# Patient Record
Sex: MALE | Race: WHITE | NOT HISPANIC OR LATINO
[De-identification: names, ages, dates, MRNs, and addresses within clinical notes are randomized per-mention and may not be internally consistent; named-entity substitution may affect disease eponyms.]

---

## 2022-12-29 PROBLEM — Z00.00 ENCOUNTER FOR PREVENTIVE HEALTH EXAMINATION: Status: ACTIVE | Noted: 2022-12-29

## 2023-03-01 ENCOUNTER — APPOINTMENT (OUTPATIENT)
Dept: NEPHROLOGY | Facility: CLINIC | Age: 73
End: 2023-03-01
Payer: MEDICARE

## 2023-03-01 ENCOUNTER — NON-APPOINTMENT (OUTPATIENT)
Age: 73
End: 2023-03-01

## 2023-03-01 VITALS
SYSTOLIC BLOOD PRESSURE: 120 MMHG | WEIGHT: 201 LBS | HEART RATE: 65 BPM | BODY MASS INDEX: 28.77 KG/M2 | TEMPERATURE: 98.3 F | HEIGHT: 70 IN | RESPIRATION RATE: 16 BRPM | OXYGEN SATURATION: 98 % | DIASTOLIC BLOOD PRESSURE: 84 MMHG

## 2023-03-01 PROCEDURE — 99407 BEHAV CHNG SMOKING > 10 MIN: CPT

## 2023-03-01 PROCEDURE — 99205 OFFICE O/P NEW HI 60 MIN: CPT | Mod: 25

## 2023-03-01 NOTE — HISTORY OF PRESENT ILLNESS
[FreeTextEntry1] : 72 yr old male, current smoker, hx DMII on lantus, PVD requiring three surgeries with stent placement (aorta and b/l fem artery stents) sent to office from cardiologist (Kimo Solano) for elevated creatinine. \par \par Current: pt/seen examined; feels well; here with daughter today; \par has detailed record of previous labwork, SCr ranging from 1.7-2.3 over last 6mos; \par SCr (12/2021-2/2023);1.76 ->1.51->2.11->2.33->2.06->2.74->2.30

## 2023-03-01 NOTE — ASSESSMENT
[FreeTextEntry1] : 1. CKD IV\par 2. DMII\par 3. HTN\par 4. PVD w/ stenting\par 5. Smoker\par \par \par Kidney US/Duplex; \par UA w/ micro; urine protein/creatinine ratio; \par Pt had labwork completed 2/8/2023;\par Continue with metropolol Succ 50mg daily; \par Pt down to 2 cigarettes weekly; did smoke 1 ppd for 50 years\par discussed smoking cessation > 7 min\par \par RTO 1month \par \par

## 2023-03-01 NOTE — PHYSICAL EXAM
[General Appearance - Alert] : alert [General Appearance - In No Acute Distress] : in no acute distress [Sclera] : the sclera and conjunctiva were normal [Outer Ear] : the ears and nose were normal in appearance [Neck Appearance] : the appearance of the neck was normal [] : no respiratory distress [Apical Impulse] : the apical impulse was normal [Heart Sounds] : normal S1 and S2 [Edema] : there was no peripheral edema [Bowel Sounds] : normal bowel sounds [No CVA Tenderness] : no ~M costovertebral angle tenderness [No Spinal Tenderness] : no spinal tenderness [Abnormal Walk] : normal gait [Cranial Nerves] : cranial nerves 2-12 were intact [Oriented To Time, Place, And Person] : oriented to person, place, and time

## 2023-03-02 ENCOUNTER — APPOINTMENT (OUTPATIENT)
Dept: ULTRASOUND IMAGING | Facility: CLINIC | Age: 73
End: 2023-03-02
Payer: MEDICARE

## 2023-03-02 ENCOUNTER — RESULT REVIEW (OUTPATIENT)
Age: 73
End: 2023-03-02

## 2023-03-02 ENCOUNTER — OUTPATIENT (OUTPATIENT)
Dept: OUTPATIENT SERVICES | Facility: HOSPITAL | Age: 73
LOS: 1 days | End: 2023-03-02
Payer: MEDICARE

## 2023-03-02 DIAGNOSIS — N18.4 CHRONIC KIDNEY DISEASE, STAGE 4 (SEVERE): ICD-10-CM

## 2023-03-02 PROCEDURE — 93975 VASCULAR STUDY: CPT | Mod: 26

## 2023-03-02 PROCEDURE — 93975 VASCULAR STUDY: CPT

## 2023-03-06 ENCOUNTER — TRANSCRIPTION ENCOUNTER (OUTPATIENT)
Age: 73
End: 2023-03-06

## 2023-03-06 LAB
APPEARANCE: CLEAR
BACTERIA: NEGATIVE
BILIRUBIN URINE: NEGATIVE
BLOOD URINE: NEGATIVE
COLOR: NORMAL
CREAT SPEC-SCNC: 74 MG/DL
CREAT/PROT UR: 0.5 RATIO
GLUCOSE QUALITATIVE U: ABNORMAL
HYALINE CASTS: 1 /LPF
KETONES URINE: NEGATIVE
LEUKOCYTE ESTERASE URINE: NEGATIVE
MICROSCOPIC-UA: NORMAL
NITRITE URINE: NEGATIVE
PH URINE: 6
PROT UR-MCNC: 39 MG/DL
PROTEIN URINE: ABNORMAL
RED BLOOD CELLS URINE: 0 /HPF
SPECIFIC GRAVITY URINE: 1.01
SQUAMOUS EPITHELIAL CELLS: 1 /HPF
UROBILINOGEN URINE: NORMAL
WHITE BLOOD CELLS URINE: 1 /HPF

## 2023-04-13 ENCOUNTER — APPOINTMENT (OUTPATIENT)
Dept: NEPHROLOGY | Facility: CLINIC | Age: 73
End: 2023-04-13
Payer: MEDICARE

## 2023-04-13 VITALS
TEMPERATURE: 98.4 F | OXYGEN SATURATION: 95 % | DIASTOLIC BLOOD PRESSURE: 76 MMHG | RESPIRATION RATE: 16 BRPM | SYSTOLIC BLOOD PRESSURE: 136 MMHG | HEART RATE: 75 BPM

## 2023-04-13 PROCEDURE — 99215 OFFICE O/P EST HI 40 MIN: CPT | Mod: 25

## 2023-04-13 PROCEDURE — 99407 BEHAV CHNG SMOKING > 10 MIN: CPT

## 2023-04-13 NOTE — HISTORY OF PRESENT ILLNESS
[FreeTextEntry1] : 72 yr old male, current smoker, hx DMII on lantus, PVD requiring three surgeries with stent placement (aorta and b/l fem artery stents) sent to office from cardiologist (Kimo Solano) for elevated creatinine. \par \par Current: pt/seen examined; feels well; here with daughter today; \par has detailed record of previous labwork, SCr ranging from 1.7-2.3 over last 6mos; \par SCr (12/2021-2/2023);1.76 ->1.51->2.11->2.33->2.06->2.74->2.30\par \par Current: Pt seen/examined; no complaints; feeling well;

## 2023-04-13 NOTE — ASSESSMENT
[FreeTextEntry1] : 1. CKD IV\par 2. DMII\par 3. HTN\par 4. PVD w/ stenting\par 5. Smoker\par \par \par Kidney US/Duplex; reviewed normal\par 0.5gm proteinuria\par Continue with metropolol Succ 50mg daily; \par Pt down to 2 cigarettes weekly; did smoke 1 ppd for 50 years\par discussed smoking cessation > 7 min\par \par RTO 3month \par \par

## 2023-08-31 ENCOUNTER — APPOINTMENT (OUTPATIENT)
Dept: NEPHROLOGY | Facility: CLINIC | Age: 73
End: 2023-08-31

## 2023-10-05 ENCOUNTER — APPOINTMENT (OUTPATIENT)
Dept: PULMONOLOGY | Facility: CLINIC | Age: 73
End: 2023-10-05
Payer: MEDICARE

## 2023-10-05 VITALS — HEIGHT: 69.5 IN | BODY MASS INDEX: 27.95 KG/M2

## 2023-10-05 VITALS
WEIGHT: 192 LBS | SYSTOLIC BLOOD PRESSURE: 130 MMHG | HEART RATE: 73 BPM | RESPIRATION RATE: 16 BRPM | BODY MASS INDEX: 27.55 KG/M2 | DIASTOLIC BLOOD PRESSURE: 72 MMHG | OXYGEN SATURATION: 98 %

## 2023-10-05 DIAGNOSIS — I25.10 ATHEROSCLEROTIC HEART DISEASE OF NATIVE CORONARY ARTERY W/OUT ANGINA PECTORIS: ICD-10-CM

## 2023-10-05 DIAGNOSIS — F17.200 NICOTINE DEPENDENCE, UNSPECIFIED, UNCOMPLICATED: ICD-10-CM

## 2023-10-05 DIAGNOSIS — E11.37X3 TYPE 2 DIABETES MELLITUS WITH DIABETIC MACULAR EDEMA, RESOLVED FOLLOWING TREATMENT, BILATERAL: ICD-10-CM

## 2023-10-05 DIAGNOSIS — I77.82 ANTINEUTROPHILIC CYTOPLASMIC ANTIBODY [ANCA] VASCULITIS: ICD-10-CM

## 2023-10-05 DIAGNOSIS — Z78.9 OTHER SPECIFIED HEALTH STATUS: ICD-10-CM

## 2023-10-05 DIAGNOSIS — I73.9 PERIPHERAL VASCULAR DISEASE, UNSPECIFIED: ICD-10-CM

## 2023-10-05 DIAGNOSIS — R05.3 CHRONIC COUGH: ICD-10-CM

## 2023-10-05 DIAGNOSIS — K22.70 BARRETT'S ESOPHAGUS W/OUT DYSPLASIA: ICD-10-CM

## 2023-10-05 DIAGNOSIS — Z82.0 FAMILY HISTORY OF EPILEPSY AND OTHER DISEASES OF THE NERVOUS SYSTEM: ICD-10-CM

## 2023-10-05 DIAGNOSIS — N18.4 CHRONIC KIDNEY DISEASE, STAGE 4 (SEVERE): ICD-10-CM

## 2023-10-05 DIAGNOSIS — Z86.39 PERSONAL HISTORY OF OTHER ENDOCRINE, NUTRITIONAL AND METABOLIC DISEASE: ICD-10-CM

## 2023-10-05 DIAGNOSIS — Z79.4 TYPE 2 DIABETES MELLITUS WITH DIABETIC MACULAR EDEMA, RESOLVED FOLLOWING TREATMENT, BILATERAL: ICD-10-CM

## 2023-10-05 DIAGNOSIS — K22.2 ESOPHAGEAL OBSTRUCTION: ICD-10-CM

## 2023-10-05 DIAGNOSIS — Z86.16 PERSONAL HISTORY OF COVID-19: ICD-10-CM

## 2023-10-05 DIAGNOSIS — Z86.79 PERSONAL HISTORY OF OTHER DISEASES OF THE CIRCULATORY SYSTEM: ICD-10-CM

## 2023-10-05 DIAGNOSIS — K21.9 GASTRO-ESOPHAGEAL REFLUX DISEASE W/OUT ESOPHAGITIS: ICD-10-CM

## 2023-10-05 PROCEDURE — 99406 BEHAV CHNG SMOKING 3-10 MIN: CPT

## 2023-10-05 PROCEDURE — 99205 OFFICE O/P NEW HI 60 MIN: CPT | Mod: 25

## 2023-10-05 RX ORDER — ASPIRIN 81 MG
81 TABLET, DELAYED RELEASE (ENTERIC COATED) ORAL
Refills: 0 | Status: ACTIVE | COMMUNITY

## 2023-10-05 RX ORDER — METOPROLOL TARTRATE 50 MG/1
50 TABLET, FILM COATED ORAL
Refills: 0 | Status: ACTIVE | COMMUNITY

## 2023-10-05 RX ORDER — OMEPRAZOLE MAGNESIUM 40 MG/1
40 CAPSULE, DELAYED RELEASE ORAL
Refills: 0 | Status: ACTIVE | COMMUNITY

## 2023-10-05 RX ORDER — ROSUVASTATIN CALCIUM 10 MG/1
10 TABLET, FILM COATED ORAL
Refills: 0 | Status: ACTIVE | COMMUNITY

## 2023-10-09 ENCOUNTER — APPOINTMENT (OUTPATIENT)
Dept: THORACIC SURGERY | Facility: CLINIC | Age: 73
End: 2023-10-09
Payer: MEDICARE

## 2023-10-09 VITALS
BODY MASS INDEX: 27.92 KG/M2 | OXYGEN SATURATION: 98 % | SYSTOLIC BLOOD PRESSURE: 113 MMHG | DIASTOLIC BLOOD PRESSURE: 62 MMHG | RESPIRATION RATE: 16 BRPM | HEART RATE: 67 BPM | TEMPERATURE: 97.3 F | WEIGHT: 195 LBS | HEIGHT: 70 IN

## 2023-10-09 DIAGNOSIS — F17.200 NICOTINE DEPENDENCE, UNSPECIFIED, UNCOMPLICATED: ICD-10-CM

## 2023-10-09 PROCEDURE — 99204 OFFICE O/P NEW MOD 45 MIN: CPT

## 2023-10-09 RX ORDER — INSULIN GLARGINE 100 [IU]/ML
100 INJECTION, SOLUTION SUBCUTANEOUS AT BEDTIME
Refills: 0 | Status: ACTIVE | COMMUNITY

## 2023-10-12 ENCOUNTER — APPOINTMENT (OUTPATIENT)
Dept: PULMONOLOGY | Facility: CLINIC | Age: 73
End: 2023-10-12
Payer: MEDICARE

## 2023-10-12 VITALS — BODY MASS INDEX: 30.25 KG/M2 | HEIGHT: 67.5 IN | WEIGHT: 195 LBS

## 2023-10-12 DIAGNOSIS — R06.02 SHORTNESS OF BREATH: ICD-10-CM

## 2023-10-12 PROCEDURE — 85018 HEMOGLOBIN: CPT | Mod: QW

## 2023-10-12 PROCEDURE — 94729 DIFFUSING CAPACITY: CPT

## 2023-10-12 PROCEDURE — 94010 BREATHING CAPACITY TEST: CPT

## 2023-10-12 PROCEDURE — 94727 GAS DIL/WSHOT DETER LNG VOL: CPT

## 2023-10-26 ENCOUNTER — OUTPATIENT (OUTPATIENT)
Dept: OUTPATIENT SERVICES | Facility: HOSPITAL | Age: 73
LOS: 1 days | End: 2023-10-26
Payer: MEDICARE

## 2023-10-26 VITALS
WEIGHT: 198.42 LBS | HEIGHT: 70 IN | SYSTOLIC BLOOD PRESSURE: 110 MMHG | DIASTOLIC BLOOD PRESSURE: 60 MMHG | RESPIRATION RATE: 20 BRPM | OXYGEN SATURATION: 97 % | HEART RATE: 67 BPM | TEMPERATURE: 98 F

## 2023-10-26 DIAGNOSIS — R91.8 OTHER NONSPECIFIC ABNORMAL FINDING OF LUNG FIELD: ICD-10-CM

## 2023-10-26 DIAGNOSIS — Z95.5 PRESENCE OF CORONARY ANGIOPLASTY IMPLANT AND GRAFT: Chronic | ICD-10-CM

## 2023-10-26 DIAGNOSIS — G47.33 OBSTRUCTIVE SLEEP APNEA (ADULT) (PEDIATRIC): Chronic | ICD-10-CM

## 2023-10-26 DIAGNOSIS — Z01.818 ENCOUNTER FOR OTHER PREPROCEDURAL EXAMINATION: ICD-10-CM

## 2023-10-26 DIAGNOSIS — K22.2 ESOPHAGEAL OBSTRUCTION: Chronic | ICD-10-CM

## 2023-10-26 DIAGNOSIS — Z29.9 ENCOUNTER FOR PROPHYLACTIC MEASURES, UNSPECIFIED: ICD-10-CM

## 2023-10-26 DIAGNOSIS — I25.10 ATHEROSCLEROTIC HEART DISEASE OF NATIVE CORONARY ARTERY WITHOUT ANGINA PECTORIS: ICD-10-CM

## 2023-10-26 DIAGNOSIS — Z98.890 OTHER SPECIFIED POSTPROCEDURAL STATES: Chronic | ICD-10-CM

## 2023-10-26 DIAGNOSIS — R91.8 OTHER NONSPECIFIC ABNORMAL FINDING OF LUNG FIELD: Chronic | ICD-10-CM

## 2023-10-26 LAB
A1C WITH ESTIMATED AVERAGE GLUCOSE RESULT: 7.5 % — HIGH (ref 4–5.6)
A1C WITH ESTIMATED AVERAGE GLUCOSE RESULT: 7.5 % — HIGH (ref 4–5.6)
ALBUMIN SERPL ELPH-MCNC: 4.1 G/DL — SIGNIFICANT CHANGE UP (ref 3.3–5.2)
ALBUMIN SERPL ELPH-MCNC: 4.1 G/DL — SIGNIFICANT CHANGE UP (ref 3.3–5.2)
ALP SERPL-CCNC: 96 U/L — SIGNIFICANT CHANGE UP (ref 40–120)
ALP SERPL-CCNC: 96 U/L — SIGNIFICANT CHANGE UP (ref 40–120)
ALT FLD-CCNC: 13 U/L — SIGNIFICANT CHANGE UP
ALT FLD-CCNC: 13 U/L — SIGNIFICANT CHANGE UP
ANION GAP SERPL CALC-SCNC: 17 MMOL/L — SIGNIFICANT CHANGE UP (ref 5–17)
ANION GAP SERPL CALC-SCNC: 17 MMOL/L — SIGNIFICANT CHANGE UP (ref 5–17)
APTT BLD: 49.1 SEC — HIGH (ref 24.5–35.6)
APTT BLD: 49.1 SEC — HIGH (ref 24.5–35.6)
AST SERPL-CCNC: 13 U/L — SIGNIFICANT CHANGE UP
AST SERPL-CCNC: 13 U/L — SIGNIFICANT CHANGE UP
BASOPHILS # BLD AUTO: 0.14 K/UL — SIGNIFICANT CHANGE UP (ref 0–0.2)
BASOPHILS # BLD AUTO: 0.14 K/UL — SIGNIFICANT CHANGE UP (ref 0–0.2)
BASOPHILS NFR BLD AUTO: 2.2 % — HIGH (ref 0–2)
BASOPHILS NFR BLD AUTO: 2.2 % — HIGH (ref 0–2)
BILIRUB SERPL-MCNC: 0.3 MG/DL — LOW (ref 0.4–2)
BILIRUB SERPL-MCNC: 0.3 MG/DL — LOW (ref 0.4–2)
BUN SERPL-MCNC: 37 MG/DL — HIGH (ref 8–20)
BUN SERPL-MCNC: 37 MG/DL — HIGH (ref 8–20)
CALCIUM SERPL-MCNC: 8.9 MG/DL — SIGNIFICANT CHANGE UP (ref 8.4–10.5)
CALCIUM SERPL-MCNC: 8.9 MG/DL — SIGNIFICANT CHANGE UP (ref 8.4–10.5)
CHLORIDE SERPL-SCNC: 102 MMOL/L — SIGNIFICANT CHANGE UP (ref 96–108)
CHLORIDE SERPL-SCNC: 102 MMOL/L — SIGNIFICANT CHANGE UP (ref 96–108)
CO2 SERPL-SCNC: 21 MMOL/L — LOW (ref 22–29)
CO2 SERPL-SCNC: 21 MMOL/L — LOW (ref 22–29)
CREAT SERPL-MCNC: 2.86 MG/DL — HIGH (ref 0.5–1.3)
CREAT SERPL-MCNC: 2.86 MG/DL — HIGH (ref 0.5–1.3)
EGFR: 22 ML/MIN/1.73M2 — LOW
EGFR: 22 ML/MIN/1.73M2 — LOW
EOSINOPHIL # BLD AUTO: 2.33 K/UL — HIGH (ref 0–0.5)
EOSINOPHIL # BLD AUTO: 2.33 K/UL — HIGH (ref 0–0.5)
EOSINOPHIL NFR BLD AUTO: 36 % — HIGH (ref 0–6)
EOSINOPHIL NFR BLD AUTO: 36 % — HIGH (ref 0–6)
ESTIMATED AVERAGE GLUCOSE: 169 MG/DL — HIGH (ref 68–114)
ESTIMATED AVERAGE GLUCOSE: 169 MG/DL — HIGH (ref 68–114)
GLUCOSE SERPL-MCNC: 256 MG/DL — HIGH (ref 70–99)
GLUCOSE SERPL-MCNC: 256 MG/DL — HIGH (ref 70–99)
HCT VFR BLD CALC: 40.3 % — SIGNIFICANT CHANGE UP (ref 39–50)
HCT VFR BLD CALC: 40.3 % — SIGNIFICANT CHANGE UP (ref 39–50)
HGB BLD-MCNC: 13.1 G/DL — SIGNIFICANT CHANGE UP (ref 13–17)
HGB BLD-MCNC: 13.1 G/DL — SIGNIFICANT CHANGE UP (ref 13–17)
IMM GRANULOCYTES NFR BLD AUTO: 0.3 % — SIGNIFICANT CHANGE UP (ref 0–0.9)
IMM GRANULOCYTES NFR BLD AUTO: 0.3 % — SIGNIFICANT CHANGE UP (ref 0–0.9)
INR BLD: 0.94 RATIO — SIGNIFICANT CHANGE UP (ref 0.85–1.18)
INR BLD: 0.94 RATIO — SIGNIFICANT CHANGE UP (ref 0.85–1.18)
LYMPHOCYTES # BLD AUTO: 1.52 K/UL — SIGNIFICANT CHANGE UP (ref 1–3.3)
LYMPHOCYTES # BLD AUTO: 1.52 K/UL — SIGNIFICANT CHANGE UP (ref 1–3.3)
LYMPHOCYTES # BLD AUTO: 23.5 % — SIGNIFICANT CHANGE UP (ref 13–44)
LYMPHOCYTES # BLD AUTO: 23.5 % — SIGNIFICANT CHANGE UP (ref 13–44)
MCHC RBC-ENTMCNC: 30.6 PG — SIGNIFICANT CHANGE UP (ref 27–34)
MCHC RBC-ENTMCNC: 30.6 PG — SIGNIFICANT CHANGE UP (ref 27–34)
MCHC RBC-ENTMCNC: 32.5 GM/DL — SIGNIFICANT CHANGE UP (ref 32–36)
MCHC RBC-ENTMCNC: 32.5 GM/DL — SIGNIFICANT CHANGE UP (ref 32–36)
MCV RBC AUTO: 94.2 FL — SIGNIFICANT CHANGE UP (ref 80–100)
MCV RBC AUTO: 94.2 FL — SIGNIFICANT CHANGE UP (ref 80–100)
MONOCYTES # BLD AUTO: 0.75 K/UL — SIGNIFICANT CHANGE UP (ref 0–0.9)
MONOCYTES # BLD AUTO: 0.75 K/UL — SIGNIFICANT CHANGE UP (ref 0–0.9)
MONOCYTES NFR BLD AUTO: 11.6 % — SIGNIFICANT CHANGE UP (ref 2–14)
MONOCYTES NFR BLD AUTO: 11.6 % — SIGNIFICANT CHANGE UP (ref 2–14)
NEUTROPHILS # BLD AUTO: 1.71 K/UL — LOW (ref 1.8–7.4)
NEUTROPHILS # BLD AUTO: 1.71 K/UL — LOW (ref 1.8–7.4)
NEUTROPHILS NFR BLD AUTO: 26.4 % — LOW (ref 43–77)
NEUTROPHILS NFR BLD AUTO: 26.4 % — LOW (ref 43–77)
PLATELET # BLD AUTO: 223 K/UL — SIGNIFICANT CHANGE UP (ref 150–400)
PLATELET # BLD AUTO: 223 K/UL — SIGNIFICANT CHANGE UP (ref 150–400)
POTASSIUM SERPL-MCNC: 4.7 MMOL/L — SIGNIFICANT CHANGE UP (ref 3.5–5.3)
POTASSIUM SERPL-MCNC: 4.7 MMOL/L — SIGNIFICANT CHANGE UP (ref 3.5–5.3)
POTASSIUM SERPL-SCNC: 4.7 MMOL/L — SIGNIFICANT CHANGE UP (ref 3.5–5.3)
POTASSIUM SERPL-SCNC: 4.7 MMOL/L — SIGNIFICANT CHANGE UP (ref 3.5–5.3)
PROT SERPL-MCNC: 7.1 G/DL — SIGNIFICANT CHANGE UP (ref 6.6–8.7)
PROT SERPL-MCNC: 7.1 G/DL — SIGNIFICANT CHANGE UP (ref 6.6–8.7)
PROTHROM AB SERPL-ACNC: 10.4 SEC — SIGNIFICANT CHANGE UP (ref 9.5–13)
PROTHROM AB SERPL-ACNC: 10.4 SEC — SIGNIFICANT CHANGE UP (ref 9.5–13)
RBC # BLD: 4.28 M/UL — SIGNIFICANT CHANGE UP (ref 4.2–5.8)
RBC # BLD: 4.28 M/UL — SIGNIFICANT CHANGE UP (ref 4.2–5.8)
RBC # FLD: 14.5 % — SIGNIFICANT CHANGE UP (ref 10.3–14.5)
RBC # FLD: 14.5 % — SIGNIFICANT CHANGE UP (ref 10.3–14.5)
SODIUM SERPL-SCNC: 140 MMOL/L — SIGNIFICANT CHANGE UP (ref 135–145)
SODIUM SERPL-SCNC: 140 MMOL/L — SIGNIFICANT CHANGE UP (ref 135–145)
WBC # BLD: 6.47 K/UL — SIGNIFICANT CHANGE UP (ref 3.8–10.5)
WBC # BLD: 6.47 K/UL — SIGNIFICANT CHANGE UP (ref 3.8–10.5)
WBC # FLD AUTO: 6.47 K/UL — SIGNIFICANT CHANGE UP (ref 3.8–10.5)
WBC # FLD AUTO: 6.47 K/UL — SIGNIFICANT CHANGE UP (ref 3.8–10.5)

## 2023-10-26 PROCEDURE — 80053 COMPREHEN METABOLIC PANEL: CPT

## 2023-10-26 PROCEDURE — 85025 COMPLETE CBC W/AUTO DIFF WBC: CPT

## 2023-10-26 PROCEDURE — 85730 THROMBOPLASTIN TIME PARTIAL: CPT

## 2023-10-26 PROCEDURE — 93010 ELECTROCARDIOGRAM REPORT: CPT

## 2023-10-26 PROCEDURE — 85610 PROTHROMBIN TIME: CPT

## 2023-10-26 PROCEDURE — 83036 HEMOGLOBIN GLYCOSYLATED A1C: CPT

## 2023-10-26 PROCEDURE — G0463: CPT

## 2023-10-26 PROCEDURE — 93005 ELECTROCARDIOGRAM TRACING: CPT

## 2023-10-26 PROCEDURE — 36415 COLL VENOUS BLD VENIPUNCTURE: CPT

## 2023-10-26 RX ORDER — INSULIN GLARGINE 100 [IU]/ML
36 INJECTION, SOLUTION SUBCUTANEOUS
Refills: 0 | DISCHARGE

## 2023-10-26 RX ORDER — ASPIRIN/CALCIUM CARB/MAGNESIUM 324 MG
1 TABLET ORAL
Refills: 0 | DISCHARGE

## 2023-10-26 RX ORDER — ROSUVASTATIN CALCIUM 5 MG/1
1 TABLET ORAL
Refills: 0 | DISCHARGE

## 2023-10-26 RX ORDER — METOPROLOL TARTRATE 50 MG
1 TABLET ORAL
Refills: 0 | DISCHARGE

## 2023-10-26 RX ORDER — TICAGRELOR 90 MG/1
1 TABLET ORAL
Refills: 0 | DISCHARGE

## 2023-10-26 NOTE — H&P PST ADULT - NSICDXFAMILYHX_GEN_ALL_CORE_FT
FAMILY HISTORY:  Father  Still living? Unknown  FH: alcohol abuse, Age at diagnosis: Age Unknown    Mother  Still living? Unknown  FH: epilepsy, Age at diagnosis: Age Unknown

## 2023-10-26 NOTE — H&P PST ADULT - ASSESSMENT
Patient:   ANKUSH SAMUELS            MRN: [id number]            FIN: [id number]              Age:   49 years     Sex:  MALE     :  68   Associated Diagnoses:   None   Author:   CHE MEDRANO     Hospitalist Discharge Summary  Admission Date: _1/15/2020  Discharge Date: _2020  Discharge Disposition: _  HOME WITH HOME HEALTH  Discharge diagnoses: _  ACUTE RESPIRATORY FAILURE DUE TO ANGIOEDEMA  Hospital course: _    ACUTE RESPIRATORY FAILURE DUE TO ANGIOEDEMA  LIKELY FROM ACE INHIBITOR  intubated 1/15-  on h2ra, antihistamine, and steroids  ctm for improvement  hold arb/ace inhibitors indefinitely; unless absolutely needed  st/pt/ot to see  patient is well, cont on steroids at discharge  ACUTE ON CHRONIC ASTHMA  started on nebs and inhalers, cont steroids at home  LEUKOCYTOSIS  related to steroid administration  CHRONIC HTN HEART DISEASE WITH DIASTOLIC HF  CHRONIC JOSEPHINE  CHRONIC HLD  CHRONIC MORBID OBESITY  CHRONIC PSORIASIS  cont home meds      Labs between:  2020 10:47 to 2020 10:47  CBC:                 WBC  HgB  Hct  Plt  MCV  RDW   2020 (H) 18.3  (L) 11.5  (L) 35.5  346  83.9  (H) 18.8   DIFF:                 Seg  Neutroph//ABS  Lymph//ABS  Mono//ABS  EOS/ABS  2020 NOT APPLICABLE  93 // (H) 17.0  4 // (L) 0.7  2 // 0.4 0 // (L) 0.0  BMP:                 Na  Cl  BUN  Glu   2020 144  (H) 110  17  (H) 128                              K  CO2  Cr  Ca                              4.3  24  1.10  9.2          Primary Care Physician    Physician Name:  EVELIA CHEN  Specialty :  FAMILY PRACTICE  No Consulting Physicians.     Results Review   General results   Most recent results   Vitals between:   2020 10:49:18   TO   2020 10:49:18                   LAST RESULT MINIMUM MAXIMUM  Temperature 36.6 36.3 36.6  Heart Rate 50 41 72  Respiratory Rate 18 13 19  NISBP           122 106 136  NIDBP           73 61 96  NIMBP           86 75 106  SpO2                     95 93 99  GEN: NAD  CVS: NORMAL S1/S2, RRR  LUNGS: SCANT WHEEZES, NO RALES  ABD: BS PRESENT, NTND  EXT: NO EDEMA/CYANOSIS  SKIN: WARM AND DRY     Discharge Information     DISCHARGE MEDICATION LIST   Allergies: ACE Inhibitors (angiotensin converting enzyme inhibitors), PCN (penicillins), penicillin  MEDICATION  DOSE  ROUTE  FREQUENCY  SPECIAL INSTRUCTIONS   albuterol-ipratropium (albuterol-ipratropium inhalation 2.5-0.5 mg/3 mL solution (DuoNeb).)  3 mL  Nebulizer  Every 4 hours As Needed: shortness of breath    amLODIPine (Norvasc oral 5 mg tablet)  5 mg=1 tab  Oral  Daily  **Prescription electronically sent to Pharmacy: [facility] #[id number]  atorvastatin (atorvastatin oral 10 mg tablet)  10 mg=1 tab  Oral  Daily    carvedilol (carvedilol oral 3.125 mg tablet)  3.125 mg=1 tab  Oral  Twice daily    ferrous sulfate (ferrous sulfate 324 mg (65 mg elemental iron) oral delayed release tablet)  324 mg=1 tab  Oral  Daily    fluticasone-salmeterol (Advair Diskus 500 mcg-50 mcg inhalation powder)  1 puff  Inhaled  Twice daily    hydrALAZINE (hydrALAZINE oral 25 mg tablet (Apresoline))  25 mg=1 tab  Oral  Twice daily    predniSONE (predniSONE oral 20 mg tablet)  20 mg=1 tab  Oral  Twice daily, with morning and evening mealsFor 7 days  - with food or milk**Prescription electronically sent to Pharmacy: [facility] #[id number]  thiamine (thiamine (vitamin B1) oral 100 mg tablet)  100 mg=1 tab  Oral  Daily    torsemide (torsemide oral 10 mg tablet (Demadex))  10 mg=1 tab  Oral  Daily          Discharge Plan   Discharge Summary Plan   I spent 45 minutes on this patient's discharge chart.  DISCHARGE INSTRUCTIONS:  Patient to follow-up with PCP. Notified PCP.  Discussed with [patient], who verbalized understanding and compliance with follow-up instructions.   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 Patient reports his cough has been persistent over the past year, dry cough at first, but recently it has been getting worse. CXR showed an abnormality with right middle lobe nodule. Patient also reports dizziness at times. He denies fevers, chills, night sweats, chest pain, palpitations, syncope. Preop assessment prior to bronchoscopy w/Dr Ford scheduled for 11/1/2023    Patient was educated on preop preparation with written and verbal instructions. Pt was informed to obtain clearances >3 days before surgery. Pt will review medications with PCP. Pt was educated on NSAIDs, multivitamins and herbals that increase the risk of bleeding and need to be stopped 7 days before procedure. Pt verbalized understanding of the above.  72 yo M PMH of HTN, HLD, CAD w/stents x1 (on aspirin, Brilinta), PVD with stents placement (aorta and bilateral femoral artery stents), CKD Stage 4, IDDM2, HINA (does not use CPAP), current smoker (average of 1 ppd x 50+ years, now down to 2 cigarettes daily), ANCA associated vasculitis in  treated with steroids, Pinto's esophagus, GERD, Schatzki's ring, pulmonary nodules, presents with c/o dry cough and SOB with exertion. Patient reports his cough has been persistent over the past year and recently it has been getting worse. CXR showed an abnormality with right middle lobe nodule. Patient also reports dizziness at times. He denies fevers, chills, night sweats, chest pain, palpitations, syncope. Preop assessment prior to bronchoscopy w/Dr Ford scheduled for 2023    Patient was educated on preop preparation with written and verbal instructions. Pt was informed to obtain clearances >3 days before surgery. Pt will ask his cardiologist for instructions on Brilinta and aspirin. Pt was educated on NSAIDs, multivitamins and herbals that increase the risk of bleeding and need to be stopped 7 days before procedure. Pt verbalized understanding of the above.     OPIOID RISK TOOL    NAVA EACH BOX THAT APPLIES AND ADD TOTALS AT THE END    FAMILY HISTORY OF SUBSTANCE ABUSE                 FEMALE         MALE                                                Alcohol                             [  ]1 pt          [  ]3pts                                               Illegal Drugs                     [  ]2 pts        [  ]3pts                                               Rx Drugs                           [  ]4 pts        [  ]4 pts    PERSONAL HISTORY OF SUBSTANCE ABUSE                                                                                          Alcohol                             [  ]3 pts       [  ]3 pts                                               Illegal Drugs                     [  ]4 pts        [  ]4 pts                                               Rx Drugs                           [  ]5 pts        [  ]5 pts    AGE BETWEEN 16-45 YEARS                                      [  ]1 pt         [  ]1 pt    HISTORY OF PREADOLESCENT   SEXUAL ABUSE                                                             [  ]3 pts        [  ]0pts    PSYCHOLOGICAL DISEASE                     ADD, OCD, Bipolar, Schizophrenia        [  ]2 pts         [  ]2 pts                      Depression                                               [  ]1 pt           [  ]1 pt           SCORING TOTAL   (add numbers and type here)              ( 0 )                                     A score of 3 or lower indicated LOW risk for future opioid abuse  A score of 4 to 7 indicated moderate risk for future opioid abuse  A score of 8 or higher indicates a high risk for opioid abuse    CAPRINI VTE 2.0 SCORE [CLOT updated 2019]    AGE RELATED RISK FACTORS                                                       MOBILITY RELATED FACTORS  [ ] Age 41-60 years                                            (1 Point)                    [ ] Bed rest                                                        (1 Point)  [x ] Age: 61-74 years                                           (2 Points)                  [ ] Plaster cast                                                   (2 Points)  [ ] Age= 75 years                                              (3 Points)                    [ ] Bed bound for more than 72 hours                 (2 Points)    DISEASE RELATED RISK FACTORS                                               GENDER SPECIFIC FACTORS  [ ] Edema in the lower extremities                       (1 Point)              [ ] Pregnancy                                                     (1 Point)  [ ] Varicose veins                                               (1 Point)                     [ ] Post-partum < 6 weeks                                   (1 Point)             [ x] BMI > 25 Kg/m2                                            (1 Point)                     [ ] Hormonal therapy  or oral contraception          (1 Point)                 [ ] Sepsis (in the previous month)                        (1 Point)               [ ] History of pregnancy complications                 (1 point)  [ ] Pneumonia or serious lung disease                                               [ ] Unexplained or recurrent                     (1 Point)           (in the previous month)                               (1 Point)  [ ] Abnormal pulmonary function test                     (1 Point)                 SURGERY RELATED RISK FACTORS  [ ] Acute myocardial infarction                              (1 Point)               [ ]  Section                                             (1 Point)  [ ] Congestive heart failure (in the previous month)  (1 Point)      [ ] Minor surgery                                                  (1 Point)   [ ] Inflammatory bowel disease                             (1 Point)               [ ] Arthroscopic surgery                                        (2 Points)  [ ] Central venous access                                      (2 Points)                [x ] General surgery lasting more than 45 minutes (2 points)  [ ] Malignancy- Present or previous                   (2 Points)                [ ] Elective arthroplasty                                         (5 points)    [ ] Stroke (in the previous month)                          (5 Points)                                                                                                                                                           HEMATOLOGY RELATED FACTORS                                                 TRAUMA RELATED RISK FACTORS  [ ] Prior episodes of VTE                                     (3 Points)                [ ] Fracture of the hip, pelvis, or leg                       (5 Points)  [ ] Positive family history for VTE                         (3 Points)             [ ] Acute spinal cord injury (in the previous month)  (5 Points)  [ ] Prothrombin 99039 A                                     (3 Points)               [ ] Paralysis  (less than 1 month)                             (5 Points)  [ ] Factor V Leiden                                             (3 Points)                  [ ] Multiple Trauma within 1 month                        (5 Points)  [ ] Lupus anticoagulants                                     (3 Points)                                                           [ ] Anticardiolipin antibodies                               (3 Points)                                                       [ ] High homocysteine in the blood                      (3 Points)                                             [ ] Other congenital or acquired thrombophilia      (3 Points)                                                [ ] Heparin induced thrombocytopenia                  (3 Points)                                     Total Score [   5      ]

## 2023-10-26 NOTE — H&P PST ADULT - NSICDXPASTSURGICALHX_GEN_ALL_CORE_FT
PAST SURGICAL HISTORY:  History of cardiac cath      PAST SURGICAL HISTORY:  H/O colonoscopy     H/O heart artery stent     History of cardiac cath

## 2023-10-26 NOTE — H&P PST ADULT - PROBLEM SELECTOR PLAN 2
caprini score risk for dvt, SCD ordered, surgical team to assess for dvt prophylaxis caprini score 5, moderate risk for dvt, SCD ordered, surgical team to assess for dvt prophylaxis

## 2023-10-26 NOTE — H&P PST ADULT - HISTORY OF PRESENT ILLNESS
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 Patient reports his cough has been persistent over the past year, dry cough at first, but recently it has been getting worse. CXR showed an abnormality with right middle lobe nodule. Patient also reports dizziness at times. He denies fevers, chills, night sweats, chest pain, palpitations, syncope. Preop assessment prior to bronchoscopy w/Dr Ford scheduled for 11/1/2023    Results/Data:    Chest CT 9/30/2022 findings:   Multiple b/l nodules measuring up to 10 mm in size with peripheral GGO and coronary artery calcifications.     CXR from 9/15/2023: R midlung peripheral nodule and ? 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 Patient reports his cough has been persistent over the past year and recently it has been getting worse. CXR showed an abnormality with right middle lobe nodule. Patient also reports dizziness at times. He denies fevers, chills, night sweats, chest pain, palpitations, syncope. Preop assessment prior to bronchoscopy w/Dr Ford scheduled for 11/1/2023    Results/Data:    Chest CT 9/30/2022 findings:   Multiple b/l nodules measuring up to 10 mm in size with peripheral GGO and coronary artery calcifications.     CXR from 9/15/2023: R midlung peripheral nodule and ? RUL smaller nodule  Chest CT 9/18/2023: 18 mm RML nodule with areas of reticular changes and nodularity  PET CT 9/23/2023: uptake in RML nodule and R hilar lymph node suspicious for malignancy and regional mets

## 2023-10-26 NOTE — H&P PST ADULT - CARDIOVASCULAR
details… regular rate and rhythm/S1 S2 present/no gallops/no rub/no murmur/no JVD/no pedal edema/vascular

## 2023-10-26 NOTE — H&P PST ADULT - NSICDXPASTMEDICALHX_GEN_ALL_CORE_FT
PAST MEDICAL HISTORY:  CAD (coronary artery disease)     Other nonspecific abnormal finding of lung field      PAST MEDICAL HISTORY:  CAD (coronary artery disease)     Diabetic neuropathy     H/O vasculitis     HLD (hyperlipidemia)     Insulin dependent type 2 diabetes mellitus     HINA (obstructive sleep apnea)     Other nonspecific abnormal finding of lung field     Pulmonary nodules     PVD (peripheral vascular disease)     Schatzki's ring     Stage 4 chronic kidney disease

## 2023-10-26 NOTE — H&P PST ADULT - NSHP PST DIAGOTHER LIST_GEN_A_CORE
10/26/2023: abnormal labs reported to cardiothoracic NP/PA team and faxed to PCP Dr Rivas with request to address abnormal results, specifically Cr 2.86 and aPTT 49.1. Pat Murphy NP

## 2023-10-26 NOTE — H&P PST ADULT - PROBLEM SELECTOR PLAN 1
preop assessment, medical and cardiac clearance pending, flex bronch EBUS w/Dr Ford scheduled for 11/1/2023 preop assessment, medical and cardiac clearances pending, flex bronch EBUS w/Dr Ford scheduled for 11/1/2023

## 2023-11-01 ENCOUNTER — TRANSCRIPTION ENCOUNTER (OUTPATIENT)
Age: 73
End: 2023-11-01

## 2023-11-01 ENCOUNTER — OUTPATIENT (OUTPATIENT)
Dept: OUTPATIENT SERVICES | Facility: HOSPITAL | Age: 73
LOS: 1 days | End: 2023-11-01
Payer: MEDICARE

## 2023-11-01 ENCOUNTER — APPOINTMENT (OUTPATIENT)
Dept: THORACIC SURGERY | Facility: HOSPITAL | Age: 73
End: 2023-11-01

## 2023-11-01 ENCOUNTER — RESULT REVIEW (OUTPATIENT)
Age: 73
End: 2023-11-01

## 2023-11-01 DIAGNOSIS — Z95.5 PRESENCE OF CORONARY ANGIOPLASTY IMPLANT AND GRAFT: Chronic | ICD-10-CM

## 2023-11-01 DIAGNOSIS — Z98.890 OTHER SPECIFIED POSTPROCEDURAL STATES: Chronic | ICD-10-CM

## 2023-11-01 DIAGNOSIS — R91.8 OTHER NONSPECIFIC ABNORMAL FINDING OF LUNG FIELD: ICD-10-CM

## 2023-11-01 PROBLEM — Z86.79 PERSONAL HISTORY OF OTHER DISEASES OF THE CIRCULATORY SYSTEM: Chronic | Status: ACTIVE | Noted: 2023-10-26

## 2023-11-01 PROBLEM — N18.4 CHRONIC KIDNEY DISEASE, STAGE 4 (SEVERE): Chronic | Status: ACTIVE | Noted: 2023-10-26

## 2023-11-01 PROBLEM — E11.9 TYPE 2 DIABETES MELLITUS WITHOUT COMPLICATIONS: Chronic | Status: ACTIVE | Noted: 2023-10-26

## 2023-11-01 PROBLEM — E11.40 TYPE 2 DIABETES MELLITUS WITH DIABETIC NEUROPATHY, UNSPECIFIED: Chronic | Status: ACTIVE | Noted: 2023-10-26

## 2023-11-01 PROBLEM — K22.2 ESOPHAGEAL OBSTRUCTION: Chronic | Status: ACTIVE | Noted: 2023-10-26

## 2023-11-01 LAB
GLUCOSE BLDC GLUCOMTR-MCNC: 87 MG/DL — SIGNIFICANT CHANGE UP (ref 70–99)
GLUCOSE BLDC GLUCOMTR-MCNC: 87 MG/DL — SIGNIFICANT CHANGE UP (ref 70–99)

## 2023-11-01 PROCEDURE — 88360 TUMOR IMMUNOHISTOCHEM/MANUAL: CPT

## 2023-11-01 PROCEDURE — 71045 X-RAY EXAM CHEST 1 VIEW: CPT | Mod: 26

## 2023-11-01 PROCEDURE — 82962 GLUCOSE BLOOD TEST: CPT

## 2023-11-01 PROCEDURE — 88173 CYTOPATH EVAL FNA REPORT: CPT | Mod: 26

## 2023-11-01 PROCEDURE — 88341 IMHCHEM/IMCYTCHM EA ADD ANTB: CPT | Mod: 26

## 2023-11-01 PROCEDURE — 88172 CYTP DX EVAL FNA 1ST EA SITE: CPT

## 2023-11-01 PROCEDURE — 31653 BRONCH EBUS SAMPLNG 3/> NODE: CPT

## 2023-11-01 PROCEDURE — 88172 CYTP DX EVAL FNA 1ST EA SITE: CPT | Mod: 26

## 2023-11-01 PROCEDURE — 88341 IMHCHEM/IMCYTCHM EA ADD ANTB: CPT

## 2023-11-01 PROCEDURE — 88112 CYTOPATH CELL ENHANCE TECH: CPT

## 2023-11-01 PROCEDURE — 31624 DX BRONCHOSCOPE/LAVAGE: CPT

## 2023-11-01 PROCEDURE — 88305 TISSUE EXAM BY PATHOLOGIST: CPT

## 2023-11-01 PROCEDURE — 88112 CYTOPATH CELL ENHANCE TECH: CPT | Mod: 26,59

## 2023-11-01 PROCEDURE — C9399: CPT

## 2023-11-01 PROCEDURE — 88342 IMHCHEM/IMCYTCHM 1ST ANTB: CPT | Mod: 26

## 2023-11-01 PROCEDURE — 71045 X-RAY EXAM CHEST 1 VIEW: CPT

## 2023-11-01 PROCEDURE — 88305 TISSUE EXAM BY PATHOLOGIST: CPT | Mod: 26

## 2023-11-01 PROCEDURE — 88342 IMHCHEM/IMCYTCHM 1ST ANTB: CPT

## 2023-11-01 PROCEDURE — 88173 CYTOPATH EVAL FNA REPORT: CPT

## 2023-11-01 NOTE — BRIEF OPERATIVE NOTE - NSICDXBRIEFPROCEDURE_GEN_ALL_CORE_FT
PROCEDURES:  Bronchoscopy, with EBUS and bronchoalveolar lavage 01-Nov-2023 11:58:25 fine needle biopsy of lymph nodes Zita San

## 2023-11-10 PROBLEM — R93.89 ABNORMAL CHEST CT: Status: ACTIVE | Noted: 2023-10-05

## 2023-11-10 PROBLEM — R91.8 MULTIPLE LUNG NODULES ON CT: Status: ACTIVE | Noted: 2023-10-05

## 2023-11-13 ENCOUNTER — APPOINTMENT (OUTPATIENT)
Dept: THORACIC SURGERY | Facility: CLINIC | Age: 73
End: 2023-11-13

## 2023-11-13 DIAGNOSIS — R93.89 ABNORMAL FINDINGS ON DIAGNOSTIC IMAGING OF OTHER SPECIFIED BODY STRUCTURES: ICD-10-CM

## 2023-11-13 DIAGNOSIS — R91.8 OTHER NONSPECIFIC ABNORMAL FINDING OF LUNG FIELD: ICD-10-CM

## 2023-11-16 PROBLEM — I73.9 PERIPHERAL VASCULAR DISEASE, UNSPECIFIED: Chronic | Status: ACTIVE | Noted: 2023-10-26

## 2023-11-16 PROBLEM — G47.33 OBSTRUCTIVE SLEEP APNEA (ADULT) (PEDIATRIC): Chronic | Status: ACTIVE | Noted: 2023-10-26

## 2023-11-16 PROBLEM — R91.8 OTHER NONSPECIFIC ABNORMAL FINDING OF LUNG FIELD: Chronic | Status: ACTIVE | Noted: 2023-10-26

## 2023-11-16 PROBLEM — I25.10 ATHEROSCLEROTIC HEART DISEASE OF NATIVE CORONARY ARTERY WITHOUT ANGINA PECTORIS: Chronic | Status: ACTIVE | Noted: 2023-10-26

## 2023-11-16 PROBLEM — E78.5 HYPERLIPIDEMIA, UNSPECIFIED: Chronic | Status: ACTIVE | Noted: 2023-10-26

## 2023-11-20 ENCOUNTER — APPOINTMENT (OUTPATIENT)
Dept: THORACIC SURGERY | Facility: CLINIC | Age: 73
End: 2023-11-20
Payer: MEDICARE

## 2023-11-20 ENCOUNTER — APPOINTMENT (OUTPATIENT)
Dept: DERMATOLOGY | Facility: CLINIC | Age: 73
End: 2023-11-20
Payer: MEDICARE

## 2023-11-20 VITALS
RESPIRATION RATE: 16 BRPM | HEART RATE: 61 BPM | SYSTOLIC BLOOD PRESSURE: 131 MMHG | WEIGHT: 195 LBS | HEIGHT: 70 IN | DIASTOLIC BLOOD PRESSURE: 83 MMHG | BODY MASS INDEX: 27.92 KG/M2 | OXYGEN SATURATION: 97 % | TEMPERATURE: 97.6 F

## 2023-11-20 PROCEDURE — 99214 OFFICE O/P EST MOD 30 MIN: CPT

## 2023-11-20 PROCEDURE — 99204 OFFICE O/P NEW MOD 45 MIN: CPT

## 2023-11-20 RX ORDER — TICAGRELOR 90 MG/1
90 TABLET ORAL
Refills: 0 | Status: COMPLETED | COMMUNITY
End: 2023-11-20

## 2023-11-20 RX ORDER — SUCRALFATE 1 G/1
1 TABLET ORAL
Refills: 0 | Status: COMPLETED | COMMUNITY
End: 2023-11-20

## 2024-05-16 ENCOUNTER — APPOINTMENT (OUTPATIENT)
Dept: DERMATOLOGY | Facility: CLINIC | Age: 74
End: 2024-05-16
Payer: MEDICARE

## 2024-05-16 PROCEDURE — 99213 OFFICE O/P EST LOW 20 MIN: CPT

## 2024-07-03 ENCOUNTER — APPOINTMENT (OUTPATIENT)
Dept: NEPHROLOGY | Facility: CLINIC | Age: 74
End: 2024-07-03
Payer: MEDICARE

## 2024-07-03 VITALS
OXYGEN SATURATION: 97 % | HEART RATE: 90 BPM | DIASTOLIC BLOOD PRESSURE: 58 MMHG | TEMPERATURE: 95 F | BODY MASS INDEX: 26.05 KG/M2 | SYSTOLIC BLOOD PRESSURE: 116 MMHG | HEIGHT: 70 IN | WEIGHT: 182 LBS

## 2024-07-03 DIAGNOSIS — I73.9 PERIPHERAL VASCULAR DISEASE, UNSPECIFIED: ICD-10-CM

## 2024-07-03 DIAGNOSIS — Z87.891 PERSONAL HISTORY OF NICOTINE DEPENDENCE: ICD-10-CM

## 2024-07-03 DIAGNOSIS — Z79.4 TYPE 2 DIABETES MELLITUS WITH DIABETIC MACULAR EDEMA, RESOLVED FOLLOWING TREATMENT, BILATERAL: ICD-10-CM

## 2024-07-03 DIAGNOSIS — E11.37X3 TYPE 2 DIABETES MELLITUS WITH DIABETIC MACULAR EDEMA, RESOLVED FOLLOWING TREATMENT, BILATERAL: ICD-10-CM

## 2024-07-03 DIAGNOSIS — F17.200 NICOTINE DEPENDENCE, UNSPECIFIED, UNCOMPLICATED: ICD-10-CM

## 2024-07-03 DIAGNOSIS — N18.4 CHRONIC KIDNEY DISEASE, STAGE 4 (SEVERE): ICD-10-CM

## 2024-07-03 PROCEDURE — 99214 OFFICE O/P EST MOD 30 MIN: CPT

## 2024-08-27 ENCOUNTER — APPOINTMENT (OUTPATIENT)
Dept: DERMATOLOGY | Facility: CLINIC | Age: 74
End: 2024-08-27
Payer: MEDICARE

## 2024-08-27 PROCEDURE — 11104 PUNCH BX SKIN SINGLE LESION: CPT

## 2024-08-27 PROCEDURE — 99213 OFFICE O/P EST LOW 20 MIN: CPT | Mod: 25

## 2024-09-10 ENCOUNTER — APPOINTMENT (OUTPATIENT)
Dept: DERMATOLOGY | Facility: CLINIC | Age: 74
End: 2024-09-10

## 2024-11-13 ENCOUNTER — APPOINTMENT (OUTPATIENT)
Dept: NEPHROLOGY | Facility: CLINIC | Age: 74
End: 2024-11-13

## 2024-12-10 ENCOUNTER — INPATIENT (INPATIENT)
Facility: HOSPITAL | Age: 74
LOS: 7 days | Discharge: HOME | DRG: 195 | End: 2024-12-18
Attending: STUDENT IN AN ORGANIZED HEALTH CARE EDUCATION/TRAINING PROGRAM | Admitting: HOSPITALIST
Payer: MEDICARE

## 2024-12-10 VITALS
TEMPERATURE: 98 F | DIASTOLIC BLOOD PRESSURE: 46 MMHG | OXYGEN SATURATION: 93 % | SYSTOLIC BLOOD PRESSURE: 79 MMHG | RESPIRATION RATE: 24 BRPM | HEART RATE: 132 BPM

## 2024-12-10 DIAGNOSIS — J12.9 VIRAL PNEUMONIA, UNSPECIFIED: ICD-10-CM

## 2024-12-10 DIAGNOSIS — Z98.890 OTHER SPECIFIED POSTPROCEDURAL STATES: Chronic | ICD-10-CM

## 2024-12-10 DIAGNOSIS — Z95.5 PRESENCE OF CORONARY ANGIOPLASTY IMPLANT AND GRAFT: Chronic | ICD-10-CM

## 2024-12-10 LAB
ALBUMIN SERPL ELPH-MCNC: 3.6 G/DL — SIGNIFICANT CHANGE UP (ref 3.3–5.2)
ALP SERPL-CCNC: 90 U/L — SIGNIFICANT CHANGE UP (ref 40–120)
ALT FLD-CCNC: 16 U/L — SIGNIFICANT CHANGE UP
ANION GAP SERPL CALC-SCNC: 21 MMOL/L — HIGH (ref 5–17)
ANISOCYTOSIS BLD QL: SLIGHT — SIGNIFICANT CHANGE UP
APTT BLD: 31.2 SEC — SIGNIFICANT CHANGE UP (ref 24.5–35.6)
AST SERPL-CCNC: 18 U/L — SIGNIFICANT CHANGE UP
BASOPHILS # BLD AUTO: 0.1 K/UL — SIGNIFICANT CHANGE UP (ref 0–0.2)
BASOPHILS NFR BLD AUTO: 0.9 % — SIGNIFICANT CHANGE UP (ref 0–2)
BILIRUB SERPL-MCNC: 0.3 MG/DL — LOW (ref 0.4–2)
BUN SERPL-MCNC: 35.8 MG/DL — HIGH (ref 8–20)
CALCIUM SERPL-MCNC: 9.1 MG/DL — SIGNIFICANT CHANGE UP (ref 8.4–10.5)
CHLORIDE SERPL-SCNC: 100 MMOL/L — SIGNIFICANT CHANGE UP (ref 96–108)
CO2 SERPL-SCNC: 16 MMOL/L — LOW (ref 22–29)
CREAT SERPL-MCNC: 3.44 MG/DL — HIGH (ref 0.5–1.3)
EGFR: 18 ML/MIN/1.73M2 — LOW
EOSINOPHIL # BLD AUTO: 1.2 K/UL — HIGH (ref 0–0.5)
EOSINOPHIL NFR BLD AUTO: 10.7 % — HIGH (ref 0–6)
FLUAV AG NPH QL: SIGNIFICANT CHANGE UP
FLUBV AG NPH QL: SIGNIFICANT CHANGE UP
GIANT PLATELETS BLD QL SMEAR: PRESENT — SIGNIFICANT CHANGE UP
GLUCOSE BLDC GLUCOMTR-MCNC: 333 MG/DL — HIGH (ref 70–99)
GLUCOSE SERPL-MCNC: 178 MG/DL — HIGH (ref 70–99)
HCT VFR BLD CALC: 30.6 % — LOW (ref 39–50)
HGB BLD-MCNC: 9.5 G/DL — LOW (ref 13–17)
HYPOCHROMIA BLD QL: SLIGHT — SIGNIFICANT CHANGE UP
INR BLD: 1.13 RATIO — SIGNIFICANT CHANGE UP (ref 0.85–1.16)
LYMPHOCYTES # BLD AUTO: 1.2 K/UL — SIGNIFICANT CHANGE UP (ref 1–3.3)
LYMPHOCYTES # BLD AUTO: 10.7 % — LOW (ref 13–44)
MANUAL SMEAR VERIFICATION: SIGNIFICANT CHANGE UP
MCHC RBC-ENTMCNC: 29.3 PG — SIGNIFICANT CHANGE UP (ref 27–34)
MCHC RBC-ENTMCNC: 31 G/DL — LOW (ref 32–36)
MCV RBC AUTO: 94.4 FL — SIGNIFICANT CHANGE UP (ref 80–100)
METAMYELOCYTES # FLD: 1.8 % — HIGH (ref 0–0)
MICROCYTES BLD QL: SLIGHT — SIGNIFICANT CHANGE UP
MONOCYTES # BLD AUTO: 1 K/UL — HIGH (ref 0–0.9)
MONOCYTES NFR BLD AUTO: 8.9 % — SIGNIFICANT CHANGE UP (ref 2–14)
MYELOCYTES NFR BLD: 1.8 % — HIGH (ref 0–0)
NEUTROPHILS # BLD AUTO: 7 K/UL — SIGNIFICANT CHANGE UP (ref 1.8–7.4)
NEUTROPHILS NFR BLD AUTO: 61.6 % — SIGNIFICANT CHANGE UP (ref 43–77)
NEUTS BAND # BLD: 0.9 % — SIGNIFICANT CHANGE UP (ref 0–8)
OVALOCYTES BLD QL SMEAR: SLIGHT — SIGNIFICANT CHANGE UP
PLAT MORPH BLD: NORMAL — SIGNIFICANT CHANGE UP
PLATELET # BLD AUTO: 322 K/UL — SIGNIFICANT CHANGE UP (ref 150–400)
POIKILOCYTOSIS BLD QL AUTO: SLIGHT — SIGNIFICANT CHANGE UP
POLYCHROMASIA BLD QL SMEAR: SLIGHT — SIGNIFICANT CHANGE UP
POTASSIUM SERPL-MCNC: 4.8 MMOL/L — SIGNIFICANT CHANGE UP (ref 3.5–5.3)
POTASSIUM SERPL-SCNC: 4.8 MMOL/L — SIGNIFICANT CHANGE UP (ref 3.5–5.3)
PROMYELOCYTES # FLD: 0.9 % — HIGH (ref 0–0)
PROT SERPL-MCNC: 7.1 G/DL — SIGNIFICANT CHANGE UP (ref 6.6–8.7)
PROTHROM AB SERPL-ACNC: 12.8 SEC — SIGNIFICANT CHANGE UP (ref 9.9–13.4)
RBC # BLD: 3.24 M/UL — LOW (ref 4.2–5.8)
RBC # FLD: 16.2 % — HIGH (ref 10.3–14.5)
RBC BLD AUTO: ABNORMAL
RSV RNA NPH QL NAA+NON-PROBE: SIGNIFICANT CHANGE UP
SARS-COV-2 RNA SPEC QL NAA+PROBE: DETECTED
SODIUM SERPL-SCNC: 137 MMOL/L — SIGNIFICANT CHANGE UP (ref 135–145)
TROPONIN T, HIGH SENSITIVITY RESULT: 35 NG/L — SIGNIFICANT CHANGE UP (ref 0–51)
TROPONIN T, HIGH SENSITIVITY RESULT: 41 NG/L — SIGNIFICANT CHANGE UP (ref 0–51)
VARIANT LYMPHS # BLD: 1.8 % — SIGNIFICANT CHANGE UP (ref 0–6)
WBC # BLD: 11.2 K/UL — HIGH (ref 3.8–10.5)
WBC # FLD AUTO: 11.2 K/UL — HIGH (ref 3.8–10.5)

## 2024-12-10 PROCEDURE — 70450 CT HEAD/BRAIN W/O DYE: CPT | Mod: 26,MC

## 2024-12-10 PROCEDURE — 71046 X-RAY EXAM CHEST 2 VIEWS: CPT | Mod: 26

## 2024-12-10 PROCEDURE — 99222 1ST HOSP IP/OBS MODERATE 55: CPT

## 2024-12-10 PROCEDURE — 99223 1ST HOSP IP/OBS HIGH 75: CPT

## 2024-12-10 PROCEDURE — 99285 EMERGENCY DEPT VISIT HI MDM: CPT | Mod: GC

## 2024-12-10 RX ORDER — 0.9 % SODIUM CHLORIDE 0.9 %
1000 INTRAVENOUS SOLUTION INTRAVENOUS
Refills: 0 | Status: DISCONTINUED | OUTPATIENT
Start: 2024-12-10 | End: 2024-12-11

## 2024-12-10 RX ORDER — INSULIN GLARGINE 100 [IU]/ML
31 INJECTION, SOLUTION SUBCUTANEOUS AT BEDTIME
Refills: 0 | Status: DISCONTINUED | OUTPATIENT
Start: 2024-12-11 | End: 2024-12-11

## 2024-12-10 RX ORDER — INSULIN GLARGINE 100 [IU]/ML
31 INJECTION, SOLUTION SUBCUTANEOUS ONCE
Refills: 0 | Status: COMPLETED | OUTPATIENT
Start: 2024-12-10 | End: 2024-12-10

## 2024-12-10 RX ORDER — 0.9 % SODIUM CHLORIDE 0.9 %
2400 INTRAVENOUS SOLUTION INTRAVENOUS ONCE
Refills: 0 | Status: COMPLETED | OUTPATIENT
Start: 2024-12-10 | End: 2024-12-10

## 2024-12-10 RX ORDER — PIPERACILLIN SODIUM AND TAZOBACTAM SODIUM 4; .5 G/20ML; G/20ML
3.38 INJECTION, POWDER, LYOPHILIZED, FOR SOLUTION INTRAVENOUS ONCE
Refills: 0 | Status: COMPLETED | OUTPATIENT
Start: 2024-12-10 | End: 2024-12-10

## 2024-12-10 RX ORDER — BENZONATATE 100 MG/1
100 CAPSULE ORAL ONCE
Refills: 0 | Status: COMPLETED | OUTPATIENT
Start: 2024-12-10 | End: 2024-12-10

## 2024-12-10 RX ORDER — ACETAMINOPHEN 500MG 500 MG/1
1000 TABLET, COATED ORAL ONCE
Refills: 0 | Status: COMPLETED | OUTPATIENT
Start: 2024-12-10 | End: 2024-12-10

## 2024-12-10 RX ORDER — GLUCAGON INJECTION, SOLUTION 0.5 MG/.1ML
1 INJECTION, SOLUTION SUBCUTANEOUS ONCE
Refills: 0 | Status: DISCONTINUED | OUTPATIENT
Start: 2024-12-10 | End: 2024-12-11

## 2024-12-10 RX ORDER — METHYLPREDNISOLONE SOD SUCC 125 MG
125 VIAL (EA) INJECTION ONCE
Refills: 0 | Status: COMPLETED | OUTPATIENT
Start: 2024-12-10 | End: 2024-12-10

## 2024-12-10 RX ADMIN — BENZONATATE 100 MILLIGRAM(S): 100 CAPSULE ORAL at 14:38

## 2024-12-10 RX ADMIN — INSULIN GLARGINE 31 UNIT(S): 100 INJECTION, SOLUTION SUBCUTANEOUS at 23:54

## 2024-12-10 RX ADMIN — Medication 2400 MILLILITER(S): at 14:00

## 2024-12-10 RX ADMIN — ACETAMINOPHEN 500MG 1000 MILLIGRAM(S): 500 TABLET, COATED ORAL at 16:29

## 2024-12-10 RX ADMIN — ACETAMINOPHEN 500MG 400 MILLIGRAM(S): 500 TABLET, COATED ORAL at 14:37

## 2024-12-10 RX ADMIN — PIPERACILLIN SODIUM AND TAZOBACTAM SODIUM 200 GRAM(S): 4; .5 INJECTION, POWDER, LYOPHILIZED, FOR SOLUTION INTRAVENOUS at 14:38

## 2024-12-10 RX ADMIN — Medication 125 MILLIGRAM(S): at 14:37

## 2024-12-10 RX ADMIN — PIPERACILLIN SODIUM AND TAZOBACTAM SODIUM 3.38 GRAM(S): 4; .5 INJECTION, POWDER, LYOPHILIZED, FOR SOLUTION INTRAVENOUS at 16:29

## 2024-12-10 RX ADMIN — Medication 2400 MILLILITER(S): at 12:53

## 2024-12-10 NOTE — ED PROVIDER NOTE - CARE PLAN
1 Principal Discharge DX:	Viral pneumonia  Secondary Diagnosis:	2019 novel coronavirus disease (COVID-19)

## 2024-12-10 NOTE — ED PROVIDER NOTE - PROGRESS NOTE DETAILS
Eloise: Labs show leukocytosis 11, lactate 5.2. Trop 41, rpt 2 hours. Ordered ofirmev for body aches and cough meds. Eloise: CXR shows rt sided infiltrate at base. Dose zosyn ordered. Eloise: Labs show leukocytosis 11, lactate 5.2. Trop 41, rpt 2 hours. Ordered ofirmev for body aches and cough meds. Troponin likely from demand ischemia. Eloise: rpt lactate 1.8. Rpt trop 35. Spoke to cardio, likely demand ischemia. Tim: Case discussed with hospitalist and will admit to telemetry

## 2024-12-10 NOTE — ED ADULT NURSE NOTE - OBJECTIVE STATEMENT
c/o chest pain. Pt stated he was at his PCP for a follow up appointment after being diagnosed with PNA. Pt suddenly developed 10/10 chest pain. Pt received 2 sprays of nitro by EMS PTA. Pt denies any CP, dizziness, N/V/D, CP, palpitations, fevers, chills at this time. Pt AOx4, speaking coherently, respirations even and unlabored on RA, skin warm and dry, ST on CM. c/o chest pain. Pt stated he was at his PCP for a follow up appointment after being diagnosed with PNA. Pt suddenly developed 10/10 chest pain. Pt received 2 sprays of nitro by Dr. Evelyn PENA. Pt denies any CP, dizziness, N/V/D, CP, palpitations, fevers, chills at this time. Pt AOx4, speaking coherently, respirations even and unlabored on RA, skin warm and dry, ST on CM.

## 2024-12-10 NOTE — CONSULT NOTE ADULT - SUBJECTIVE AND OBJECTIVE BOX
CHIEF COMPLAINT:    HPI: 74 y.o. male with hx of coronary artery disease s/p drug-eluting stent therapy of the proximal LAD in November 2022 , carotid artery disease (80% left carotid stenosis), peripheral arterial disease right SFA stent therapy at that time and then subsequently underwent left SFA stent therapy in 2023, hypertension, hyperlipidemia and lung cancer treated with chemotherapy and radiation and apparently has no evidence of disease at this time. He presented to [facility] in February 2024 with a left occipital stroke, confirmed by MRI. Patient comes to the ER complaining of chest pain. Patient was at Dr. Rivas PCP office for flu-like symptoms after recent dx of septic pneumonia 1 months ago & hospital stay at [place]. Patient was diagnosed with covid today at PCP. Patient had sudden, crushing, central nonradiating chest pain, PCP gave 2 sprays of nitro. Patient then had seizure-like acitivity per family, likely vasovagaled, BP was 70s/40s, hypoxic. Patient had no post-ictal state, patient is A&Ox3. Patient states no longer has chest pain. Patient c/o cough, SOB. Denies numbness/tingling, abdominal pain, N/V/D. NO recent falls or injury.    PAST MEDICAL & SURGICAL HISTORY:  Other nonspecific abnormal finding of lung field      CAD (coronary artery disease)      PVD (peripheral vascular disease)      H/O vasculitis      Schatzki's ring      Pulmonary nodules      HINA (obstructive sleep apnea)      Insulin dependent type 2 diabetes mellitus      HLD (hyperlipidemia)      Stage 4 chronic kidney disease      Diabetic neuropathy      History of cardiac cath      H/O heart artery stent      H/O colonoscopy          Allergies    sulfa topicals (Unknown)  Plavix (Stomach Upset)    Intolerances        MEDICATIONS  (STANDING):    MEDICATIONS  (PRN):      FAMILY HISTORY:  FH: epilepsy (Mother)    FH: alcohol abuse (Father)        ***No family history of premature coronary artery disease or sudden cardiac death    SOCIAL HISTORY:  Smoking-  Alcohol-  Illicit Drug use-    REVIEW OF SYSTEMS:  Constitutional: [ ] fever, [ ]weight loss,  [ ]fatigue  Eyes: [ ] visual changes  Respiratory: [ ]shortness of breath;  [ ] cough, [ ]wheezing, [ ]chills, [ ]hemoptysis  Cardiovascular: [ ] chest pain, [ ]palpitations, [ ]dizziness,  [ ]leg swelling [ ]syncope  Gastrointestinal: [ ] abdominal pain, [ ]nausea, [ ]vomiting,  [ ]diarrhea   Genitourinary: [ ] dysuria, [ ] hematuria  Neurologic: [ ] headaches [ ] tremors  [ ] weakness [ ] lightheadedness  Skin: [ ] itching, [ ]burning, [ ] rashes  Endocrine: [ ] heat or cold intolerance  Musculoskeletal: [ ] joint pain or swelling; [ ] muscle, back, or extremity pain  Psychiatric: [ ] depression, [ ]anxiety, [ ]mood swings, or [ ]difficulty sleeping  Hematologic: [ ] easy bruising, [ ] bleeding gums     [ x] All others negative	  [ ] Unable to obtain    Vital Signs Last 24 Hrs  T(C): 36.7 (10 Dec 2024 12:07), Max: 36.7 (10 Dec 2024 12:07)  T(F): 98 (10 Dec 2024 12:07), Max: 98 (10 Dec 2024 12:07)  HR: 100 (10 Dec 2024 14:49) (100 - 132)  BP: 123/61 (10 Dec 2024 14:49) (79/46 - 123/61)  BP(mean): 74 (10 Dec 2024 12:42) (74 - 74)  RR: 25 (10 Dec 2024 14:49) (24 - 26)  SpO2: 100% (10 Dec 2024 14:49) (93% - 100%)    Parameters below as of 10 Dec 2024 14:49  Patient On (Oxygen Delivery Method): nasal cannula  O2 Flow (L/min): 4    I&O's Summary      PHYSICAL EXAM:  General: No acute distress  HEENT: EOMI  Neck:  No JVD  Lungs: Clear to auscultation bilaterally; No rales or wheezing  Heart: Regular rate and rhythm; No murmurs, rubs, or gallops  Abdomen: soft, non tender, non distended   Extremities: warm, no edema   Nervous system:  Alert & Oriented X3  Psychiatric: Normal affect  Skin: No rashes or lesions    LABS:  12-10    137  |  100  |  35.8[H]  ----------------------------<  178[H]  4.8   |  16.0[L]  |  3.44[H]    Ca    9.1      10 Dec 2024 12:25    TPro  7.1  /  Alb  3.6  /  TBili  0.3[L]  /  DBili  x   /  AST  18  /  ALT  16  /  AlkPhos  90  12-10    Creatinine Trend: 3.44<--                        9.5    11.20 )-----------( 322      ( 10 Dec 2024 12:25 )             30.6     PT/INR - ( 10 Dec 2024 12:25 )   PT: 12.8 sec;   INR: 1.13 ratio         PTT - ( 10 Dec 2024 12:25 )  PTT:31.2 sec    Lipid Panel:   Cardiac Enzymes:           < from: Xray Chest 2 Views PA/Lat (12.10.24 @ 13:46) >  Right hilar infiltrate.    < from: 12 Lead ECG (12.10.24 @ 12:07) >  Sinus tachycardia  Nonspecific ST abnormality      TELEMETRY:    Echocardiography 4/4/24: Normal left ventricular size and function, mild left ventricular hypertrophy, ejection fraction 55-60%, diastolic dysfunction is noted. There is mild mitral regurgitation, mild aortic regurgitation, aortic root is dilated at 3.8 cm.     Carotid duplex scans 10/10/24: Patent left internal carotid stent, moderate (50% to 65%) right internal carotid artery narrowings.    STRESS TEST:    CATHETERIZATION: CHIEF COMPLAINT:    HPI: 74 y.o. male with hx of coronary artery disease s/p drug-eluting stent therapy of the proximal LAD in November 2022 , carotid artery disease (80% left carotid stenosis), peripheral arterial disease right SFA stent therapy at that time and then subsequently underwent left SFA stent therapy in 2023, hypertension, hyperlipidemia and lung cancer treated with chemotherapy and radiation and apparently has no evidence of disease at this time. He presented to [facility] in February 2024 with a left occipital stroke, confirmed by MRI. Patient comes to the ER complaining of chest pain. Patient was at Dr. Rivas PCP office for flu-like symptoms after recent dx of septic pneumonia 1 months ago & hospital stay at [place]. Patient was diagnosed with covid today at PCP. Patient had sudden, crushing, central nonradiating chest pain, PCP gave 2 sprays of nitro. Patient then had seizure-like acitivity per family, likely vasovagaled, BP was 70s/40s, hypoxic. Patient had no post-ictal state, patient is A&Ox3. Patient states no longer has chest pain. Patient c/o cough, SOB. Denies numbness/tingling, abdominal pain, N/V/D. NO recent falls or injury.    PAST MEDICAL & SURGICAL HISTORY:  Other nonspecific abnormal finding of lung field      CAD (coronary artery disease)      PVD (peripheral vascular disease)      H/O vasculitis      Schatzki's ring      Pulmonary nodules      HINA (obstructive sleep apnea)      Insulin dependent type 2 diabetes mellitus      HLD (hyperlipidemia)      Stage 4 chronic kidney disease      Diabetic neuropathy      History of cardiac cath      H/O heart artery stent      H/O colonoscopy          Allergies    sulfa topicals (Unknown)  Plavix (Stomach Upset)    Intolerances        MEDICATIONS  (STANDING):    MEDICATIONS  (PRN):      FAMILY HISTORY:  FH: epilepsy (Mother)    FH: alcohol abuse (Father)        ***No family history of premature coronary artery disease or sudden cardiac death    SOCIAL HISTORY:  Smoking-  Alcohol-  Illicit Drug use-    REVIEW OF SYSTEMS:  Constitutional: [ ] fever, [ ]weight loss,  [ ]fatigue  Eyes: [ ] visual changes  Respiratory: [ ]shortness of breath;  [ ] cough, [ ]wheezing, [ ]chills, [ ]hemoptysis  Cardiovascular: [ ] chest pain, [ ]palpitations, [ ]dizziness,  [ ]leg swelling [ ]syncope  Gastrointestinal: [ ] abdominal pain, [ ]nausea, [ ]vomiting,  [ ]diarrhea   Genitourinary: [ ] dysuria, [ ] hematuria  Neurologic: [ ] headaches [ ] tremors  [ ] weakness [ ] lightheadedness  Skin: [ ] itching, [ ]burning, [ ] rashes  Endocrine: [ ] heat or cold intolerance  Musculoskeletal: [ ] joint pain or swelling; [ ] muscle, back, or extremity pain  Psychiatric: [ ] depression, [ ]anxiety, [ ]mood swings, or [ ]difficulty sleeping  Hematologic: [ ] easy bruising, [ ] bleeding gums     [ x] All others negative	  [ ] Unable to obtain    Vital Signs Last 24 Hrs  T(C): 36.7 (10 Dec 2024 12:07), Max: 36.7 (10 Dec 2024 12:07)  T(F): 98 (10 Dec 2024 12:07), Max: 98 (10 Dec 2024 12:07)  HR: 100 (10 Dec 2024 14:49) (100 - 132)  BP: 123/61 (10 Dec 2024 14:49) (79/46 - 123/61)  BP(mean): 74 (10 Dec 2024 12:42) (74 - 74)  RR: 25 (10 Dec 2024 14:49) (24 - 26)  SpO2: 100% (10 Dec 2024 14:49) (93% - 100%)    Parameters below as of 10 Dec 2024 14:49  Patient On (Oxygen Delivery Method): nasal cannula  O2 Flow (L/min): 4    I&O's Summary      PHYSICAL EXAM:  General: No acute distress  HEENT: EOMI  Neck:  No JVD  Lungs: Clear to auscultation bilaterally; No rales or wheezing  Heart: Regular rate and rhythm; No murmurs, rubs, or gallops  Abdomen: soft, non tender, non distended   Extremities: warm, no edema   Nervous system:  Alert & Oriented X3  Psychiatric: Normal affect  Skin: No rashes or lesions    LABS:  12-10    137  |  100  |  35.8[H]  ----------------------------<  178[H]  4.8   |  16.0[L]  |  3.44[H]    Ca    9.1      10 Dec 2024 12:25    TPro  7.1  /  Alb  3.6  /  TBili  0.3[L]  /  DBili  x   /  AST  18  /  ALT  16  /  AlkPhos  90  12-10    Creatinine Trend: 3.44<--                        9.5    11.20 )-----------( 322      ( 10 Dec 2024 12:25 )             30.6     PT/INR - ( 10 Dec 2024 12:25 )   PT: 12.8 sec;   INR: 1.13 ratio         PTT - ( 10 Dec 2024 12:25 )  PTT:31.2 sec    Lipid Panel:   Cardiac Enzymes:           < from: Xray Chest 2 Views PA/Lat (12.10.24 @ 13:46) >  Right hilar infiltrate.    < from: 12 Lead ECG (12.10.24 @ 12:07) >  Sinus tachycardia  Nonspecific ST abnormality      TELEMETRY: SR    Echocardiography 4/4/24: Normal left ventricular size and function, mild left ventricular hypertrophy, ejection fraction 55-60%, diastolic dysfunction is noted. There is mild mitral regurgitation, mild aortic regurgitation, aortic root is dilated at 3.8 cm.     Carotid duplex scans 10/10/24: Patent left internal carotid stent, moderate (50% to 65%) right internal carotid artery narrowings.    STRESS TEST:    CATHETERIZATION:

## 2024-12-10 NOTE — ED ADULT TRIAGE NOTE - CHIEF COMPLAINT QUOTE
BIBEMS from [facility] with c/o 10/10 chest pain. was diagnosed with covid/pna. received 1 nitro at . hx 5 stents.

## 2024-12-10 NOTE — CONSULT NOTE ADULT - ASSESSMENT
74 y.o. male with hx of coronary artery disease s/p drug-eluting stent therapy of the proximal LAD in November 2022 , carotid artery disease (80% left carotid stenosis), peripheral arterial disease right SFA stent therapy at that time and then subsequently underwent left SFA stent therapy in 2023, hypertension, hyperlipidemia and lung cancer treated with chemotherapy and radiation and apparently has no evidence of disease at this time. He presented to [facility] in February 2024 with a left occipital stroke, confirmed by MRI. Patient comes to the ER complaining of chest pain. Patient was at Dr. Rivas PCP office for flu-like symptoms after recent dx of septic pneumonia 1 months ago & hospital stay at [place]. Patient was diagnosed with covid today at PCP. Patient had sudden, crushing, central nonradiating chest pain, PCP gave 2 sprays of nitro. Patient then had seizure-like acitivity per family, likely vasovagaled, BP was 70s/40s, hypoxic. Patient had no post-ictal state, patient is A&Ox3. Patient states no longer has chest pain. Patient c/o cough, SOB. Denies numbness/tingling, abdominal pain, N/V/D. NO recent falls or injury.   74 y.o. male with hx of coronary artery disease s/p drug-eluting stent therapy of the proximal LAD in November 2022 , carotid artery disease (80% left carotid stenosis), peripheral arterial disease right SFA stent therapy at that time and then subsequently underwent left SFA stent therapy in 2023, hypertension, hyperlipidemia and lung cancer treated with chemotherapy and radiation and apparently has no evidence of disease at this time. He presented to [facility] in February 2024 with a left occipital stroke, confirmed by MRI. Patient comes to the ER complaining of chest pain. Patient was at Dr. Rivas PCP office for flu-like symptoms after recent dx of septic pneumonia 1 months ago & hospital stay at [place]. Patient was diagnosed with covid today at PCP. Patient had sudden, crushing, central nonradiating chest pain, PCP gave 2 sprays of nitro. Patient then had seizure-like acitivity per family, likely vasovagaled, BP was 70s/40s, hypoxic. Patient had no post-ictal state, patient is A&Ox3. Patient states no longer has chest pain. Patient c/o cough, SOB. Denies numbness/tingling, abdominal pain, N/V/D. NO recent falls or injury.      Nonanginal chest pain   negative cardiac testing so far    Plan   obtain another troponin level   echocardiogram   telemetry while admitted  Rest as per ER/ Primary team

## 2024-12-10 NOTE — ED PROVIDER NOTE - CLINICAL SUMMARY MEDICAL DECISION MAKING FREE TEXT BOX
74y Male hx CAD s/p 5 stents, PAD, CKD, DM complaining of chest pain. Plan for labs, fluids, steroids, ct, cxr. 74y Male hx CAD s/p 5 stents, PAD, CKD, DM complaining of chest pain. Plan for labs, fluids, steroids, ct, cxr. Patient placed on 4L NC for hypoxia to low 90s on room air. Labs show leukocytosis 11, lactate 5.2. Trop 41, rpt 2 hours. Ordered ofirmev for body aches and cough meds. 74y Male hx CAD s/p 5 stents, PAD, CKD, DM complaining of chest pain. Plan for labs, fluids, steroids, ct, cxr. Patient placed on 4L NC for hypoxia to low 90s on room air. Labs show leukocytosis 11, lactate 5.2. Trop 41, rpt 2 hours, trop likely demand ischemia. Ordered ofirmev for body aches and cough meds. CXR shows rt sided infiltrate at base. Dose zosyn ordered. Rpt lactate ordered after fluids. Likely admit for viral pneumonia. 74y Male hx CAD s/p 5 stents, PAD, CKD, DM complaining of chest pain. Plan for labs, fluids, steroids, ct, cxr. Patient placed on 4L NC for hypoxia to low 90s on room air. Labs show leukocytosis 11, lactate 5.2. Trop 41, rpt 2 hours, trop likely demand ischemia. Ordered ofirmev for body aches and cough meds. CXR shows rt sided infiltrate at base. Dose zosyn ordered. Rpt lactate ordered after fluids. Likely admit for viral pneumonia. rpt lactate 1.8. Rpt trop 35. Spoke to cardio, likely demand ischemia.

## 2024-12-10 NOTE — ED PROVIDER NOTE - OBJECTIVE STATEMENT
74y Male hx CAD s/p 5 stents, PAD, CKD, DM complaining of chest pain. Patient was at Dr. Rivas PCP office for flu-like symptoms after recent dx of septic pneumonia 2 weeks prior & hospital stay at [place]. Patient was diagnosed with covid today at PCP. Patient had sudden, crushing, central nonradiating chest pain, PCP gave 2 sprays of nitro. Patient then had seizure-like acitivity per family, likely vasovagaled, BP was 70s/40s. Patient had no post-ictal state, patient is A&Ox3. Patient states no longer has chest pain. Patient c/o cough, SOB. Denies numbness/tingling, abdominal pain, N/V/D. 74y Male hx CAD s/p 5 stents, PAD, CKD, DM complaining of chest pain. Patient was at Dr. Rivas PCP office for flu-like symptoms after recent dx of septic pneumonia 1 months ago & hospital stay at [place]. Patient was diagnosed with covid today at PCP. Patient had sudden, crushing, central nonradiating chest pain, PCP gave 2 sprays of nitro. Patient then had seizure-like acitivity per family, likely vasovagaled, BP was 70s/40s. Patient had no post-ictal state, patient is A&Ox3. Patient states no longer has chest pain. Patient c/o cough, SOB. Denies numbness/tingling, abdominal pain, N/V/D. NO recent falls or injury. 74y Male hx CAD s/p 5 stents, PAD, CKD, DM complaining of chest pain. Patient was at Dr. Rivas PCP office for flu-like symptoms after recent dx of septic pneumonia 1 months ago & hospital stay at [place]. Patient was diagnosed with covid today at PCP. Patient had sudden, crushing, central nonradiating chest pain, PCP gave 2 sprays of nitro. Patient then had seizure-like acitivity per family, likely vasovagaled, BP was 70s/40s, hypoxic. Patient had no post-ictal state, patient is A&Ox3. Patient states no longer has chest pain. Patient c/o cough, SOB. Denies numbness/tingling, abdominal pain, N/V/D. NO recent falls or injury.

## 2024-12-10 NOTE — ED ADULT NURSE NOTE - NSFALLUNIVINTERV_ED_ALL_ED
Multilayer Compression Wrap   (Not Unna) Below the Knee    NAME:  Vita Coffey  YOB: 1932  MEDICAL RECORD NUMBER:  [id number]  DATE:  11/24/2023    Multilayer compression wrap: Removed old Multilayer wrap if indicated and wash leg with mild soap/water. Applied moisturizing agent to dry skin as needed. Applied primary and secondary dressing as ordered. Applied multilayered dressing below the knee to left lower leg. Instructed patient/caregiver not to remove dressing and to keep it clean and dry. Instructed patient/caregiver on complications to report to provider, such as pain, numbness in toes, heavy drainage, and slippage of dressing. Instructed patient on purpose of compression dressing and on activity and exercise recommendations.       Electronically signed by Tani Davison RN on 11/24/2023 at 11:10 AM Bed/Stretcher in lowest position, wheels locked, appropriate side rails in place/Call bell, personal items and telephone in reach/Instruct patient to call for assistance before getting out of bed/chair/stretcher/Non-slip footwear applied when patient is off stretcher/[place] to call system/Physically safe environment - no spills, clutter or unnecessary equipment/Purposeful proactive rounding/Room/bathroom lighting operational, light cord in reach

## 2024-12-10 NOTE — ED PEDIATRIC NURSE NOTE - OBJECTIVE STATEMENT
Pt presents to the ED with mom for difficulty breathing. Mom reports they went to PCP was given, prednisone and nebulizer, was better but began pulling again when they got home. Pt RR even and unlabored, subclavicular muscles pulling upon respiration. Pt UTD on vaccines. Mom reports he was sicklast week, got better for two days, then got worse again, cough since last week.

## 2024-12-10 NOTE — H&P ADULT - NSHPLABSRESULTS_GEN_ALL_CORE
Contacted pt to discuss department's initiative to protect all non-essential and high risk patients from COVID-19 exposure. Reviewed may continue with HEP as long as it is well tolerated/going well.   Explained that the clinic is cancelling visits for the n
12-11    136  |  103  |  42.3[H]  ----------------------------<  290[H]  5.2   |  17.0[L]  |  3.36[H]    Ca    8.6      11 Dec 2024 02:03    TPro  6.3[L]  /  Alb  3.2[L]  /  TBili  <0.2[L]  /  DBili  x   /  AST  18  /  ALT  18  /  AlkPhos  87  12-11                            8.8    8.22  )-----------( 257      ( 11 Dec 2024 02:03 )             28.1       CXR- R-hilar infiltrate     EKG- sinus tachycardia, , Q waves V1

## 2024-12-10 NOTE — ED PROVIDER NOTE - ATTENDING CONTRIBUTION TO CARE
74-year-old male presents to the ED via EMS from primary care physician's office for further evaluation of chest pain.   patient status post admission  [facility] last month for complicated pneumonia for  which she received Zyvox and was reported to be hypoxic requiring supplemental oxygen.  Patient states he was seen by Dr. Rivas last week and given antibiotics without improvement and then today was supposed to receive a "stronger antibiotic " when he complained of chest pain  was given 2 sprays of nitro and became hypotensive tachycardic.   on arrival to ED patient awake and alert appears uncomfortable tachypneic heart rate sinus 130s initially hypotensive BP 70/40's  which improved spontaneously after arrival patient states he was diagnosed with COVID today.    PERRL, mm moist, neck supple, heart tachycardic,  lungs coarse breath sounds bilaterally, abdomen soft no localized tenderness, extremities no cyanosis or edema, neuro no focal deficits.  Chest x-ray with right middle lobe infiltrate.  Will treat empirically for sepsis, however based on history of COVID patient most likely with viral pneumonia.  Patient follows with [facility]/Dr. Solano and will consult [facility]

## 2024-12-10 NOTE — H&P ADULT - HISTORY OF PRESENT ILLNESS
74yoM hx CAD s/p PCI (11/2022), PAD, CVA (2014), CKD4, lung cancer s/p chemotherapy and RT, admitted to [facility] in 11/2024 for sepsis and respiratory failure due to multifocal PNA, presenting with chest pain, cough and dyspnea.  Hx from chart review as pt declines interview.  Pt has had few days of cough and exertional dyspnea.  He developed intermittent chest pain over the past day prompting him to see his doctor.  He was given nitroglycerin in the office after episode of central, non-radiating chest pain, then had syncopal event with hypotension (BP reportedly 70/40).  After event, pt returned to baseline and was sent to the hospital.  On arrival to ED, pt hypotensive 79/46, tachycardic to the 130s and satting in 90s. BP improved after IVF resuscitation. COVID positive.  CXR notable for R-hilar infiltrate.

## 2024-12-10 NOTE — H&P ADULT - ASSESSMENT
74yoM hx CAD s/p PCI (11/2022), PAD, CVA (2014), CKD4, lung cancer s/p chemotherapy and RT, admitted to [facility] in 11/2024 for sepsis and respiratory failure due to multifocal PNA, presenting with chest pain, cough and dyspnea, on arrival to ED, hypotensive, tachycardic, tachypneic with mild leukocytosis, COVID+ and w/ CXR showing R-hilar infiltrate     Sepsis due to bacterial PNA and COVID  -Received zosyn by ED, will continue and add vancomycin given recent hospitalization   -Received solumedrol  by ED, will start dexamethasone given COVID  -Start remdesivir   -Elevated lactate 5.2 due to hypotension; normalized after IVF  -Obtain sputum culture, MRSA swab, urine Strep, Legionella  -Blood cx pending  -Placed on NC, wean as tolerated    Hypotension  -Multifactorial from nitroglycerin use as outpatient, hypovolemia from infection  -s/p 2.4L given by ED w/ improvement   -BP stable >4 hours after IVF, will do telemetry and VS q4hr  -Hold metoprolol for now, resume if BP remains stable    Atypical chest pain  -Suspect related to COVID, PNA  -Seen by cardiology, believes non-cardiac, but recommends TTE  -EKG w/out acute ischemic changes  -Troponin negative x 3  -Telemetry    CAMRON on CKD   -From hypovolemia from hypotension  -Cr improved slightly after IVF, unknown recent baseline, was 2.86 in 2023  -Obtain renal US and UA  -Resume NaHCO3    Anemia  -AOCD of kidney disease based on iron studies  -No reported bleeding  -Hgb in 8-9 range, was previously 13 in 2023    Hx DM with hyperglycemia  -Hyperglycemia >300, likely exacerbated by steroid use  -Resume lantus 31U w/ moderate ISS while on steroids    Hx CAD, PAD  -Resume ASA, statin   -B-blocker on hold due to hypotension on arrival    Hx lung cancer  -Details of current therapy unknown   -Outpatient oncology follow up    Medication management   -Med rec based on Med[facility] as pt doesn’t have list and only remembers lantus dose  -Call pharmacy in AM    Prophylactic measure  -Heparin 5000 units BID    Dispo: medically active pending IV abx, cx, symptomatic improvement.  Estimated LOS: possibly >3 days, pending PT eval.

## 2024-12-10 NOTE — H&P ADULT - NSHPPHYSICALEXAM_GEN_ALL_CORE
Vital Signs Last 24 Hrs  T(C): 36.7 (11 Dec 2024 05:10), Max: 36.8 (10 Dec 2024 16:26)  T(F): 98 (11 Dec 2024 05:10), Max: 98.3 (10 Dec 2024 16:26)  HR: 95 (11 Dec 2024 05:10) (80 - 132)  BP: 138/68 (11 Dec 2024 05:10) (79/46 - 138/68)  BP(mean): 74 (10 Dec 2024 12:42) (74 - 74)  RR: 19 (11 Dec 2024 05:10) (18 - 26)  SpO2: 98% (11 Dec 2024 05:10) (93% - 100%)    Parameters below as of 11 Dec 2024 05:10  Patient On (Oxygen Delivery Method): room air    GENERAL:  Tired appearing elderly male, not in acute distress, mild tachypnea  EYES:  Clear conjunctiva, extraocular movement intact  ENT: Moist mucous membranes  RESP:  Mild labored breathing pattern, bilateral rhonchi  CV: Regular rate and rhythm, no murmurs appreciated, no lower extremity edema  GI: Soft, non-tender, non-distended  NEURO: Awake, alert, conversant, able to move all extremities, light touch sensation grossly intact  PSYCH: Somewhat agitated   SKIN: No rash or lesions, warm and dry

## 2024-12-10 NOTE — ED PROVIDER NOTE - NSICDXPASTMEDICALHX_GEN_ALL_CORE_FT
PAST MEDICAL HISTORY:  CAD (coronary artery disease)     Diabetic neuropathy     H/O vasculitis     HLD (hyperlipidemia)     Insulin dependent type 2 diabetes mellitus     HINA (obstructive sleep apnea)     Other nonspecific abnormal finding of lung field     Pulmonary nodules     PVD (peripheral vascular disease)     Schatzki's ring     Stage 4 chronic kidney disease

## 2024-12-11 LAB
A1C WITH ESTIMATED AVERAGE GLUCOSE RESULT: 8.8 % — HIGH (ref 4–5.6)
ALBUMIN SERPL ELPH-MCNC: 3.2 G/DL — LOW (ref 3.3–5.2)
ALP SERPL-CCNC: 87 U/L — SIGNIFICANT CHANGE UP (ref 40–120)
ALT FLD-CCNC: 18 U/L — SIGNIFICANT CHANGE UP
ANION GAP SERPL CALC-SCNC: 16 MMOL/L — SIGNIFICANT CHANGE UP (ref 5–17)
APPEARANCE UR: CLEAR — SIGNIFICANT CHANGE UP
AST SERPL-CCNC: 18 U/L — SIGNIFICANT CHANGE UP
BACTERIA # UR AUTO: NEGATIVE /HPF — SIGNIFICANT CHANGE UP
BASOPHILS # BLD AUTO: 0.06 K/UL — SIGNIFICANT CHANGE UP (ref 0–0.2)
BASOPHILS NFR BLD AUTO: 0.7 % — SIGNIFICANT CHANGE UP (ref 0–2)
BILIRUB SERPL-MCNC: <0.2 MG/DL — LOW (ref 0.4–2)
BILIRUB UR-MCNC: NEGATIVE — SIGNIFICANT CHANGE UP
BUN SERPL-MCNC: 42.3 MG/DL — HIGH (ref 8–20)
CALCIUM SERPL-MCNC: 8.6 MG/DL — SIGNIFICANT CHANGE UP (ref 8.4–10.5)
CAST: 2 /LPF — SIGNIFICANT CHANGE UP (ref 0–4)
CHLORIDE SERPL-SCNC: 103 MMOL/L — SIGNIFICANT CHANGE UP (ref 96–108)
CK SERPL-CCNC: 140 U/L — SIGNIFICANT CHANGE UP (ref 30–200)
CO2 SERPL-SCNC: 17 MMOL/L — LOW (ref 22–29)
COLOR SPEC: YELLOW — SIGNIFICANT CHANGE UP
CREAT SERPL-MCNC: 3.36 MG/DL — HIGH (ref 0.5–1.3)
DIFF PNL FLD: NEGATIVE — SIGNIFICANT CHANGE UP
EGFR: 18 ML/MIN/1.73M2 — LOW
EOSINOPHIL # BLD AUTO: 0.02 K/UL — SIGNIFICANT CHANGE UP (ref 0–0.5)
EOSINOPHIL NFR BLD AUTO: 0.2 % — SIGNIFICANT CHANGE UP (ref 0–6)
ESTIMATED AVERAGE GLUCOSE: 206 MG/DL — HIGH (ref 68–114)
FERRITIN SERPL-MCNC: 1410 NG/ML — HIGH (ref 30–400)
GLUCOSE BLDC GLUCOMTR-MCNC: 178 MG/DL — HIGH (ref 70–99)
GLUCOSE BLDC GLUCOMTR-MCNC: 188 MG/DL — HIGH (ref 70–99)
GLUCOSE BLDC GLUCOMTR-MCNC: 357 MG/DL — HIGH (ref 70–99)
GLUCOSE BLDC GLUCOMTR-MCNC: 401 MG/DL — HIGH (ref 70–99)
GLUCOSE BLDC GLUCOMTR-MCNC: 404 MG/DL — HIGH (ref 70–99)
GLUCOSE SERPL-MCNC: 290 MG/DL — HIGH (ref 70–99)
GLUCOSE UR QL: >=1000 MG/DL
HCT VFR BLD CALC: 28.1 % — LOW (ref 39–50)
HGB BLD-MCNC: 8.8 G/DL — LOW (ref 13–17)
IMM GRANULOCYTES NFR BLD AUTO: 5.5 % — HIGH (ref 0–0.9)
IRON SATN MFR SERPL: 16 % — SIGNIFICANT CHANGE UP (ref 16–55)
IRON SATN MFR SERPL: 31 UG/DL — LOW (ref 59–158)
KETONES UR-MCNC: ABNORMAL MG/DL
LEUKOCYTE ESTERASE UR-ACNC: NEGATIVE — SIGNIFICANT CHANGE UP
LYMPHOCYTES # BLD AUTO: 0.64 K/UL — LOW (ref 1–3.3)
LYMPHOCYTES # BLD AUTO: 7.8 % — LOW (ref 13–44)
MCHC RBC-ENTMCNC: 28.9 PG — SIGNIFICANT CHANGE UP (ref 27–34)
MCHC RBC-ENTMCNC: 31.3 G/DL — LOW (ref 32–36)
MCV RBC AUTO: 92.1 FL — SIGNIFICANT CHANGE UP (ref 80–100)
MONOCYTES # BLD AUTO: 0.16 K/UL — SIGNIFICANT CHANGE UP (ref 0–0.9)
MONOCYTES NFR BLD AUTO: 1.9 % — LOW (ref 2–14)
MRSA PCR RESULT.: SIGNIFICANT CHANGE UP
NEUTROPHILS # BLD AUTO: 6.89 K/UL — SIGNIFICANT CHANGE UP (ref 1.8–7.4)
NEUTROPHILS NFR BLD AUTO: 83.9 % — HIGH (ref 43–77)
NITRITE UR-MCNC: NEGATIVE — SIGNIFICANT CHANGE UP
PH UR: 5.5 — SIGNIFICANT CHANGE UP (ref 5–8)
PLATELET # BLD AUTO: 257 K/UL — SIGNIFICANT CHANGE UP (ref 150–400)
POTASSIUM SERPL-MCNC: 5.2 MMOL/L — SIGNIFICANT CHANGE UP (ref 3.5–5.3)
POTASSIUM SERPL-SCNC: 5.2 MMOL/L — SIGNIFICANT CHANGE UP (ref 3.5–5.3)
PROCALCITONIN SERPL-MCNC: 0.3 NG/ML — HIGH (ref 0.02–0.1)
PROT SERPL-MCNC: 6.3 G/DL — LOW (ref 6.6–8.7)
PROT UR-MCNC: 30 MG/DL
RBC # BLD: 3.05 M/UL — LOW (ref 4.2–5.8)
RBC # FLD: 15.9 % — HIGH (ref 10.3–14.5)
RBC CASTS # UR COMP ASSIST: 1 /HPF — SIGNIFICANT CHANGE UP (ref 0–4)
S AUREUS DNA NOSE QL NAA+PROBE: SIGNIFICANT CHANGE UP
SODIUM SERPL-SCNC: 136 MMOL/L — SIGNIFICANT CHANGE UP (ref 135–145)
SP GR SPEC: 1.02 — SIGNIFICANT CHANGE UP (ref 1–1.03)
SQUAMOUS # UR AUTO: 1 /HPF — SIGNIFICANT CHANGE UP (ref 0–5)
TIBC SERPL-MCNC: 197 UG/DL — LOW (ref 220–430)
TRANSFERRIN SERPL-MCNC: 138 MG/DL — LOW (ref 180–329)
TROPONIN T, HIGH SENSITIVITY RESULT: 24 NG/L — SIGNIFICANT CHANGE UP (ref 0–51)
UROBILINOGEN FLD QL: 0.2 MG/DL — SIGNIFICANT CHANGE UP (ref 0.2–1)
WBC # BLD: 8.22 K/UL — SIGNIFICANT CHANGE UP (ref 3.8–10.5)
WBC # FLD AUTO: 8.22 K/UL — SIGNIFICANT CHANGE UP (ref 3.8–10.5)
WBC UR QL: 1 /HPF — SIGNIFICANT CHANGE UP (ref 0–5)

## 2024-12-11 PROCEDURE — 99232 SBSQ HOSP IP/OBS MODERATE 35: CPT

## 2024-12-11 PROCEDURE — 76775 US EXAM ABDO BACK WALL LIM: CPT | Mod: 26

## 2024-12-11 PROCEDURE — 93010 ELECTROCARDIOGRAM REPORT: CPT

## 2024-12-11 PROCEDURE — 99233 SBSQ HOSP IP/OBS HIGH 50: CPT | Mod: GC

## 2024-12-11 RX ORDER — VANCOMYCIN HCL 900 MCG/MG
1000 POWDER (GRAM) MISCELLANEOUS
Refills: 0 | Status: DISCONTINUED | OUTPATIENT
Start: 2024-12-11 | End: 2024-12-11

## 2024-12-11 RX ORDER — ONDANSETRON HYDROCHLORIDE 4 MG/1
4 TABLET, FILM COATED ORAL EVERY 6 HOURS
Refills: 0 | Status: DISCONTINUED | OUTPATIENT
Start: 2024-12-11 | End: 2024-12-18

## 2024-12-11 RX ORDER — GUAIFENESIN/DEXTROMETHORPHAN 100-10MG/5
10 SYRUP ORAL EVERY 4 HOURS
Refills: 0 | Status: DISCONTINUED | OUTPATIENT
Start: 2024-12-11 | End: 2024-12-18

## 2024-12-11 RX ORDER — 0.9 % SODIUM CHLORIDE 0.9 %
1000 INTRAVENOUS SOLUTION INTRAVENOUS
Refills: 0 | Status: DISCONTINUED | OUTPATIENT
Start: 2024-12-11 | End: 2024-12-18

## 2024-12-11 RX ORDER — METOPROLOL TARTRATE 100 MG/1
1 TABLET, FILM COATED ORAL
Refills: 0 | DISCHARGE

## 2024-12-11 RX ORDER — INSULIN GLARGINE 100 [IU]/ML
35 INJECTION, SOLUTION SUBCUTANEOUS AT BEDTIME
Refills: 0 | Status: DISCONTINUED | OUTPATIENT
Start: 2024-12-11 | End: 2024-12-12

## 2024-12-11 RX ORDER — REMDESIVIR 100 MG/1
100 INJECTION, POWDER, LYOPHILIZED, FOR SOLUTION INTRAVENOUS EVERY 24 HOURS
Refills: 0 | Status: COMPLETED | OUTPATIENT
Start: 2024-12-12 | End: 2024-12-15

## 2024-12-11 RX ORDER — BENZONATATE 100 MG/1
100 CAPSULE ORAL EVERY 8 HOURS
Refills: 0 | Status: DISCONTINUED | OUTPATIENT
Start: 2024-12-11 | End: 2024-12-18

## 2024-12-11 RX ORDER — OMEPRAZOLE 20 MG/1
1 CAPSULE, DELAYED RELEASE ORAL
Refills: 0 | DISCHARGE

## 2024-12-11 RX ORDER — ALBUTEROL 90 MCG
2 AEROSOL (GRAM) INHALATION
Refills: 0 | DISCHARGE

## 2024-12-11 RX ORDER — ALBUTEROL 90 MCG
3 AEROSOL (GRAM) INHALATION
Refills: 0 | DISCHARGE

## 2024-12-11 RX ORDER — HEPARIN SODIUM,PORCINE 1000/ML
5000 VIAL (ML) INJECTION EVERY 12 HOURS
Refills: 0 | Status: DISCONTINUED | OUTPATIENT
Start: 2024-12-11 | End: 2024-12-18

## 2024-12-11 RX ORDER — PIPERACILLIN SODIUM AND TAZOBACTAM SODIUM 4; .5 G/20ML; G/20ML
3.38 INJECTION, POWDER, LYOPHILIZED, FOR SOLUTION INTRAVENOUS EVERY 12 HOURS
Refills: 0 | Status: DISCONTINUED | OUTPATIENT
Start: 2024-12-11 | End: 2024-12-16

## 2024-12-11 RX ORDER — REMDESIVIR 100 MG/1
INJECTION, POWDER, LYOPHILIZED, FOR SOLUTION INTRAVENOUS
Refills: 0 | Status: COMPLETED | OUTPATIENT
Start: 2024-12-11 | End: 2024-12-15

## 2024-12-11 RX ORDER — ACETAMINOPHEN, DIPHENHYDRAMINE HCL, PHENYLEPHRINE HCL 325; 25; 5 MG/1; MG/1; MG/1
3 TABLET ORAL AT BEDTIME
Refills: 0 | Status: DISCONTINUED | OUTPATIENT
Start: 2024-12-11 | End: 2024-12-18

## 2024-12-11 RX ORDER — FAMOTIDINE 20 MG/1
10 TABLET, FILM COATED ORAL DAILY
Refills: 0 | Status: DISCONTINUED | OUTPATIENT
Start: 2024-12-11 | End: 2024-12-18

## 2024-12-11 RX ORDER — PANTOPRAZOLE SODIUM 40 MG/1
40 TABLET, DELAYED RELEASE ORAL
Refills: 0 | Status: DISCONTINUED | OUTPATIENT
Start: 2024-12-11 | End: 2024-12-18

## 2024-12-11 RX ORDER — SODIUM BICARBONATE 84 MG/ML
1300 INJECTION, SOLUTION INTRAVENOUS THREE TIMES A DAY
Refills: 0 | Status: DISCONTINUED | OUTPATIENT
Start: 2024-12-11 | End: 2024-12-18

## 2024-12-11 RX ORDER — ROSUVASTATIN CALCIUM 5 MG/1
1 TABLET, FILM COATED ORAL
Refills: 0 | DISCHARGE

## 2024-12-11 RX ORDER — REMDESIVIR 100 MG/1
200 INJECTION, POWDER, LYOPHILIZED, FOR SOLUTION INTRAVENOUS EVERY 24 HOURS
Refills: 0 | Status: COMPLETED | OUTPATIENT
Start: 2024-12-11 | End: 2024-12-11

## 2024-12-11 RX ORDER — SODIUM BICARBONATE 84 MG/ML
2 INJECTION, SOLUTION INTRAVENOUS
Refills: 0 | DISCHARGE

## 2024-12-11 RX ORDER — FAMOTIDINE 20 MG/1
1 TABLET, FILM COATED ORAL
Refills: 0 | DISCHARGE

## 2024-12-11 RX ORDER — IPRATROPIUM BROMIDE AND ALBUTEROL SULFATE 2.5; .5 MG/3ML; MG/3ML
3 SOLUTION RESPIRATORY (INHALATION) ONCE
Refills: 0 | Status: COMPLETED | OUTPATIENT
Start: 2024-12-11 | End: 2024-12-11

## 2024-12-11 RX ORDER — GLUCAGON INJECTION, SOLUTION 0.5 MG/.1ML
1 INJECTION, SOLUTION SUBCUTANEOUS ONCE
Refills: 0 | Status: DISCONTINUED | OUTPATIENT
Start: 2024-12-11 | End: 2024-12-18

## 2024-12-11 RX ORDER — DEXAMETHASONE 1.5 MG/1
6 TABLET ORAL DAILY
Refills: 0 | Status: DISCONTINUED | OUTPATIENT
Start: 2024-12-11 | End: 2024-12-16

## 2024-12-11 RX ORDER — ROSUVASTATIN CALCIUM 5 MG/1
20 TABLET, FILM COATED ORAL AT BEDTIME
Refills: 0 | Status: DISCONTINUED | OUTPATIENT
Start: 2024-12-11 | End: 2024-12-18

## 2024-12-11 RX ORDER — ACETAMINOPHEN 500MG 500 MG/1
650 TABLET, COATED ORAL EVERY 6 HOURS
Refills: 0 | Status: DISCONTINUED | OUTPATIENT
Start: 2024-12-11 | End: 2024-12-18

## 2024-12-11 RX ORDER — VANCOMYCIN HCL 900 MCG/MG
1000 POWDER (GRAM) MISCELLANEOUS EVERY 12 HOURS
Refills: 0 | Status: DISCONTINUED | OUTPATIENT
Start: 2024-12-11 | End: 2024-12-11

## 2024-12-11 RX ADMIN — Medication 12: at 08:34

## 2024-12-11 RX ADMIN — PIPERACILLIN SODIUM AND TAZOBACTAM SODIUM 25 GRAM(S): 4; .5 INJECTION, POWDER, LYOPHILIZED, FOR SOLUTION INTRAVENOUS at 17:58

## 2024-12-11 RX ADMIN — REMDESIVIR 200 MILLIGRAM(S): 100 INJECTION, POWDER, LYOPHILIZED, FOR SOLUTION INTRAVENOUS at 05:50

## 2024-12-11 RX ADMIN — Medication 10 UNIT(S): at 12:02

## 2024-12-11 RX ADMIN — Medication 2: at 17:13

## 2024-12-11 RX ADMIN — Medication 5000 UNIT(S): at 05:51

## 2024-12-11 RX ADMIN — INSULIN GLARGINE 35 UNIT(S): 100 INJECTION, SOLUTION SUBCUTANEOUS at 21:15

## 2024-12-11 RX ADMIN — Medication 10: at 12:03

## 2024-12-11 RX ADMIN — PIPERACILLIN SODIUM AND TAZOBACTAM SODIUM 25 GRAM(S): 4; .5 INJECTION, POWDER, LYOPHILIZED, FOR SOLUTION INTRAVENOUS at 05:55

## 2024-12-11 RX ADMIN — Medication 5000 UNIT(S): at 17:58

## 2024-12-11 RX ADMIN — Medication 5 UNIT(S): at 17:12

## 2024-12-11 RX ADMIN — ROSUVASTATIN CALCIUM 20 MILLIGRAM(S): 5 TABLET, FILM COATED ORAL at 21:13

## 2024-12-11 RX ADMIN — Medication 81 MILLIGRAM(S): at 13:33

## 2024-12-11 RX ADMIN — FAMOTIDINE 10 MILLIGRAM(S): 20 TABLET, FILM COATED ORAL at 13:32

## 2024-12-11 RX ADMIN — IPRATROPIUM BROMIDE AND ALBUTEROL SULFATE 3 MILLILITER(S): 2.5; .5 SOLUTION RESPIRATORY (INHALATION) at 03:29

## 2024-12-11 RX ADMIN — Medication 250 MILLIGRAM(S): at 03:29

## 2024-12-11 RX ADMIN — DEXAMETHASONE 6 MILLIGRAM(S): 1.5 TABLET ORAL at 05:51

## 2024-12-11 RX ADMIN — Medication 5 UNIT(S): at 12:02

## 2024-12-11 NOTE — PHARMACOTHERAPY INTERVENTION NOTE - COMMENTS
Outpatient Medication Review updated using outpatient [facility] fill records.     HOME MEDICATIONS:  Albuterol (Eqv-ProAir HFA) 90 mcg/inh inhalation aerosol: 2 puff(s) inhaled every 6 hours as needed for  shortness of breath and/or wheezing (11 Dec 2024 15:20)  albuterol 2.5 mg/3 mL (0.083%) inhalation solution: 3 milliliter(s) by nebulizer 3 times a day as needed for  shortness of breath and/or wheezing (11 Dec 2024 15:22)  aspirin 81 mg oral tablet: 1 tab(s) orally once a day (11 Dec 2024 15:23)  famotidine 10 mg oral tablet: 1 tab(s) orally every other day (11 Dec 2024 15:19)  Lantus 100 units/mL subcutaneous solution: 36 unit(s) subcutaneous once a day (at bedtime) (11 Dec 2024 15:20)  metoprolol succinate 50 mg oral capsule, extended release: 1 cap(s) orally once a day (11 Dec 2024 15:20)  omeprazole 40 mg oral delayed release capsule: 1 cap(s) orally once a day (11 Dec 2024 15:22)  rosuvastatin 20 mg oral tablet: 1 tab(s) orally once a day (11 Dec 2024 15:20)  sodium bicarbonate 650 mg oral tablet: 2 tab(s) orally 3 times a day (11 Dec 2024 15:20)  
MRSA PCR negative, discussed with attending can discontinue vancomycin.

## 2024-12-11 NOTE — PROGRESS NOTE ADULT - SUBJECTIVE AND OBJECTIVE BOX
JADE MESSINA  [id number]      Chief Complaint:  CP/COVID/PNA    Interval History:  Patient with no further CP.  Occ cough.  Denies SOB, palps.    Tele:  No events      acetaminophen     Tablet .. 650 milliGRAM(s) Oral every 6 hours PRN  aspirin enteric coated 81 milliGRAM(s) Oral daily  benzonatate 100 milliGRAM(s) Oral every 8 hours PRN  dexAMETHasone  Injectable 6 milliGRAM(s) IV Push daily  dextrose 5%. 1000 milliLiter(s) IV Continuous <Continuous>  dextrose 5%. 1000 milliLiter(s) IV Continuous <Continuous>  dextrose 50% Injectable 25 Gram(s) IV Push once  dextrose 50% Injectable 12.5 Gram(s) IV Push once  dextrose 50% Injectable 25 Gram(s) IV Push once  dextrose Oral Gel 15 Gram(s) Oral once PRN  famotidine    Tablet 10 milliGRAM(s) Oral daily  glucagon  Injectable 1 milliGRAM(s) IntraMuscular once  guaifenesin/dextromethorphan Oral Liquid 10 milliLiter(s) Oral every 4 hours PRN  heparin   Injectable 5000 Unit(s) SubCutaneous every 12 hours  insulin glargine Injectable (LANTUS) 31 Unit(s) SubCutaneous at bedtime  insulin lispro (ADMELOG) corrective regimen sliding scale   SubCutaneous three times a day before meals  melatonin 3 milliGRAM(s) Oral at bedtime PRN  ondansetron Injectable 4 milliGRAM(s) IV Push every 6 hours PRN  piperacillin/tazobactam IVPB.. 3.375 Gram(s) IV Intermittent every 12 hours  remdesivir  IVPB   IV Intermittent   rosuvastatin 20 milliGRAM(s) Oral at bedtime  sodium bicarbonate 1300 milliGRAM(s) Oral three times a day  vancomycin  IVPB 1000 milliGRAM(s) IV Intermittent every 36 hours          Physical Exam:  T(C): 36.7 (12-11-24 @ 05:10), Max: 36.8 (12-10-24 @ 16:26)  HR: 97 (12-11-24 @ 08:27) (80 - 132)  BP: 147/81 (12-11-24 @ 08:27) (79/46 - 147/81)  RR: 21 (12-11-24 @ 08:27) (18 - 26)  SpO2: 100% (12-11-24 @ 08:27) (93% - 100%)  General: Comfortable in NAD  Neck: No JVD  CVS: nl s1s2, no s3  Pulm: CTA b/l  Abd: soft, non-tender  Ext: No c/c/e  Neuro A&O x3  Psych: Normal affect      Labs:   11 Dec 2024 02:03    136    |  103    |  42.3   ----------------------------<  290    5.2     |  17.0   |  3.36     Ca    8.6        11 Dec 2024 02:03    TPro  6.3    /  Alb  3.2    /  TBili  <0.2   /  DBili  x      /  AST  18     /  ALT  18     /  AlkPhos  87     11 Dec 2024 02:03                          8.8    8.22  )-----------( 257      ( 11 Dec 2024 02:03 )             28.1     PT/INR - ( 10 Dec 2024 12:25 )   PT: 12.8 sec;   INR: 1.13 ratio         PTT - ( 10 Dec 2024 12:25 )  PTT:31.2 sec      Echocardiography 4/4/24: Normal left ventricular size and function, mild left ventricular hypertrophy, ejection fraction 55-60%, diastolic dysfunction is noted. There is mild mitral regurgitation, mild aortic regurgitation, aortic root is dilated at 3.8 cm.     Carotid duplex scans 10/10/24: Patent left internal carotid stent, moderate (50% to 65%) right internal carotid artery narrowings.      Assessment:  74 y.o. male with hx of coronary artery disease s/p drug-eluting stent therapy of the proximal LAD in November 2022 , carotid artery disease (80% left carotid stenosis), peripheral arterial disease right SFA stent therapy at that time and then subsequently underwent left SFA stent therapy in 2023, hypertension, hyperlipidemia and lung cancer treated with chemotherapy and radiation and apparently has no evidence of disease at this time. He presented to [facility] in February 2024 with a left occipital stroke, confirmed by MRI. Patient comes to the ER complaining of chest pain. Patient was at Dr. Rivas PCP office for flu-like symptoms after recent dx of septic pneumonia 1 months ago & hospital stay at [place]. Patient was diagnosed with covid today at PCP. Patient had sudden, crushing, central nonradiating chest pain, PCP gave 2 sprays of nitro. Patient then had seizure-like acitivity per family, likely vasovagaled, BP was 70s/40s, hypoxic. Patient had no post-ictal state. Patient states no longer has chest pain.   -Trops negative.    Plan:  1. F/u echo.  2. If echo unchanged from prior no additional CV w/u now.  3. Continue current CV meds at current doses.  4. If no further CP and echo unchanged would recommend  OP f/u with Dr. Solano and would recommend OP stress when recovered.

## 2024-12-11 NOTE — PROGRESS NOTE ADULT - SUBJECTIVE AND OBJECTIVE BOX
HEALTH ISSUES - PROBLEM Dx:    CC- Septic shock sec to COVID viral infection    74yoM hx CAD s/p PCI (2022), PAD, CVA (), CKD4, lung cancer s/p chemotherapy and RT, [facility] for sepsis and respiratory failure due to multifocal PNA,    INTERVAL HPI/ OVERNIGHT EVENTS:    cough +  denies CP  no dyspnea  discussed plan of care    REVIEW OF SYSTEMS:    as above    Vital Signs Last 24 Hrs  T(C): 36.5 (11 Dec 2024 11:56), Max: 36.8 (10 Dec 2024 16:26)  T(F): 97.7 (11 Dec 2024 11:56), Max: 98.3 (10 Dec 2024 16:26)  HR: 94 (11 Dec 2024 11:56) (80 - 102)  BP: 119/73 (11 Dec 2024 11:56) (114/50 - 147/81)  BP(mean): --  RR: 18 (11 Dec 2024 11:56) (18 - 25)  SpO2: 100% (11 Dec 2024 11:56) (98% - 100%)    Parameters below as of 11 Dec 2024 11:56  Patient On (Oxygen Delivery Method): nasal cannula      PHYSICAL EXAM-  GENERAL: elderly male, not in acute distress, saturating well on room air at 98%  EYES:  Clear conjunctiva, extraocular movement intact  ENT: Moist mucous membranes  RESP: no WOB, bilateral rhonchi which clears with cough  CV: Regular rate and rhythm, no murmurs appreciated, no lower extremity edema  GI: Soft, non-tender, non-distended  NEURO: Awake, alert, conversant, able to move all extremities, light touch sensation grossly intact  PSYCH: Somewhat agitated   SKIN: No rash or lesions, warm and dry    MEDICATIONS  (STANDING):  aspirin enteric coated 81 milliGRAM(s) Oral daily  dexAMETHasone  Injectable 6 milliGRAM(s) IV Push daily  dextrose 5%. 1000 milliLiter(s) (50 mL/Hr) IV Continuous <Continuous>  dextrose 5%. 1000 milliLiter(s) (100 mL/Hr) IV Continuous <Continuous>  dextrose 50% Injectable 25 Gram(s) IV Push once  dextrose 50% Injectable 12.5 Gram(s) IV Push once  dextrose 50% Injectable 25 Gram(s) IV Push once  famotidine    Tablet 10 milliGRAM(s) Oral daily  glucagon  Injectable 1 milliGRAM(s) IntraMuscular once  heparin   Injectable 5000 Unit(s) SubCutaneous every 12 hours  insulin glargine Injectable (LANTUS) 35 Unit(s) SubCutaneous at bedtime  insulin lispro (ADMELOG) corrective regimen sliding scale   SubCutaneous three times a day before meals  insulin lispro Injectable (ADMELOG) 5 Unit(s) SubCutaneous three times a day before meals  piperacillin/tazobactam IVPB.. 3.375 Gram(s) IV Intermittent every 12 hours  remdesivir  IVPB   IV Intermittent   rosuvastatin 20 milliGRAM(s) Oral at bedtime  sodium bicarbonate 1300 milliGRAM(s) Oral three times a day  vancomycin  IVPB 1000 milliGRAM(s) IV Intermittent every 36 hours    MEDICATIONS  (PRN):  acetaminophen     Tablet .. 650 milliGRAM(s) Oral every 6 hours PRN Temp greater or equal to 38C (100.4F), Mild Pain (1 - 3), Moderate Pain (4 - 6)  benzonatate 100 milliGRAM(s) Oral every 8 hours PRN Cough  dextrose Oral Gel 15 Gram(s) Oral once PRN Blood Glucose LESS THAN 70 milliGRAM(s)/deciliter  guaifenesin/dextromethorphan Oral Liquid 10 milliLiter(s) Oral every 4 hours PRN Cough  melatonin 3 milliGRAM(s) Oral at bedtime PRN Insomnia  ondansetron Injectable 4 milliGRAM(s) IV Push every 6 hours PRN Nausea and/or Vomiting      LABS:                        8.8    8.22  )-----------( 257      ( 11 Dec 2024 02:03 )             28.1     12-11    136  |  103  |  42.3[H]  ----------------------------<  290[H]  5.2   |  17.0[L]  |  3.36[H]    Ca    8.6      11 Dec 2024 02:03    TPro  6.3[L]  /  Alb  3.2[L]  /  TBili  <0.2[L]  /  DBili  x   /  AST  18  /  ALT  18  /  AlkPhos  87  12-11    PT/INR - ( 10 Dec 2024 12:25 )   PT: 12.8 sec;   INR: 1.13 ratio         PTT - ( 10 Dec 2024 12:25 )  PTT:31.2 sec  Urinalysis Basic - ( 11 Dec 2024 09:56 )    Color: Yellow / Appearance: Clear / S.024 / pH: x  Gluc: x / Ketone: Trace mg/dL  / Bili: Negative / Urobili: 0.2 mg/dL   Blood: x / Protein: 30 mg/dL / Nitrite: Negative   Leuk Esterase: Negative / RBC: 1 /HPF / WBC 1 /HPF   Sq Epi: x / Non Sq Epi: 1 /HPF / Bacteria: Negative /HPF    Xray Chest 2 Views PA/Lat:   ACC: [id number] EXAM:  XR CHEST PA LAT 2V   ORDERED BY: CALVIN MIAN     PROCEDURE DATE:  12/10/2024          INTERPRETATION:  TECHNIQUE: 2 views of the chest.    COMPARISON: 2023    CLINICAL HISTORY: Chest Pain    FINDINGS:    Frontal and lateral views of the chest demonstrates right hilar   infiltrate. The cardiomediastinal silhouette is unremarkable. No acute   osseous abnormalities. Overlying EKG leads and wires are noted.    IMPRESSION:    Right hilar infiltrate.    --- End of Report ---      MARCUS GONSALES MD; Attending Radiologist  This document has been electronically signed. Dec 10 2024  2:36PM (12-10-24 @ 13:46)    RADIOLOGY:  Reviewed myself

## 2024-12-11 NOTE — PROGRESS NOTE ADULT - ASSESSMENT
74 y.o. male with hx of coronary artery disease s/p drug-eluting stent therapy of the proximal LAD in November 2022 , carotid artery disease (80% left carotid stenosis), peripheral arterial disease right SFA stent therapy at that time and then subsequently underwent left SFA stent therapy in 2023, hypertension, hyperlipidemia and lung cancer treated with chemotherapy and radiation and apparently has no evidence of disease at this time. He presented to [facility] in February 2024 with a left occipital stroke. Had recent dx of septic pneumonia 1 months ago & hospital stay at [place]. Patient was diagnosed with Covid today at PCP. Patient had sudden, crushing, central nonradiating chest pain, PCP gave 2 sprays of nitro. Patient then had seizure-like acitivity per family, likely vasovagaled, BP was 70s/40s, hypoxic. Patient had no post-ictal state, patient is A&Ox3. Admitted with COVID infestion and septic shock.    # Initially in triage had shock- 79/46mmHg, 132 bpm, 24/mt = septic shock  # 2 to COVID infection      # Chest pain      # CAMRON on CKD      # AOCD     74 y.o. male with hx of coronary artery disease s/p drug-eluting stent therapy of the proximal LAD in November 2022 , carotid artery disease (80% left carotid stenosis), peripheral arterial disease right SFA stent therapy at that time and then subsequently underwent left SFA stent therapy in 2023, hypertension, hyperlipidemia and lung cancer treated with chemotherapy and radiation and apparently has no evidence of disease at this time. He presented to [facility] in February 2024 with a left occipital stroke. Had recent dx of septic pneumonia 1 months ago & hospital stay at [place]. Patient was diagnosed with Covid today at PCP. Patient had sudden, crushing, central non radiating chest pain, PCP gave 2 sprays of nitro. Patient then had seizure-like acitivity per family, likely vasovagaled, BP was 70s/40s, hypoxic. Patient had no post-ictal state, patient is A&Ox3. Admitted with COVID infestion and septic shock.    # Initially in triage had shock- 79/46mmHg, 132 bpm, 24/mt = septic shock  # 2 to COVID infection  # 2 to possible superimposed bacterial pneumonia  # vs shock sec to vasovagal episode  Remedesevir, Dexamethasone, PPI  NC O2 as needed. On room air now  If remains on room air, can stop Remdesevir at 3 days vs completing 5 day course  Hemodynamically stable now, shock resolved. WBC normalized  On Zosyn as well for possible superimposed bacterial pneumonia  CXR reviewed- RLL and mid lobe findings unclear if related to lung cancer history.  Complete Zosyn x 5-7 days  will check MRSA PCR and if neg will d/c Vanc.  until then monitor Vanc troughs    # Chest pain- resolved  seen by cardiology  atypical CP  EKG NSR. QTc 480s non acute otherwise  Trops x 3 negative    # CAMRON on CKD vs CKD Stg 4; NAGMA  compared to 2023 GFR still in Stg 4 CKD range.  avoid nephrotoxics  renally dose meds  renal USG was ordered at admission  on PO bicarb    # AOCD sec to chemo and CKD  Hgb 8-9 range  BT if hgb 7 or less  can f/u renal in o/p and consider Epo     # Hx DM with hyperglycemia; HgbA1c is 8.8  -likely exacerbated by steroid use  -increase Lantus to 35, add premeal, c/w ISS    # Hx CAD, PAD  -Resume ASA, statin   -B-blocker on hold due to hypotension on arrival    # Hx lung cancer  -Details of current therapy unknown   -Outpatient oncology follow up    # Medication management   -Med rec based on Med[facility]- contacted clinical pharmacist to confirm list    # Prophylactic measure  -Heparin 5000 units BID    Dispo: tbd; medically active pending IV abx    High Acuity and Spent at least 50 mins in evaluating, assessing and medical decision making in providing patient care separate from teaching.

## 2024-12-12 ENCOUNTER — RESULT REVIEW (OUTPATIENT)
Age: 74
End: 2024-12-12

## 2024-12-12 LAB
BUN SERPL-MCNC: 53.3 MG/DL — HIGH (ref 8–20)
CALCIUM SERPL-MCNC: 8.6 MG/DL — SIGNIFICANT CHANGE UP (ref 8.4–10.5)
CHLORIDE SERPL-SCNC: 106 MMOL/L — SIGNIFICANT CHANGE UP (ref 96–108)
CO2 SERPL-SCNC: 19 MMOL/L — LOW (ref 22–29)
CREAT SERPL-MCNC: 3.19 MG/DL — HIGH (ref 0.5–1.3)
EGFR: 20 ML/MIN/1.73M2 — LOW
GLUCOSE BLDC GLUCOMTR-MCNC: 155 MG/DL — HIGH (ref 70–99)
GLUCOSE BLDC GLUCOMTR-MCNC: 261 MG/DL — HIGH (ref 70–99)
GLUCOSE BLDC GLUCOMTR-MCNC: 268 MG/DL — HIGH (ref 70–99)
GLUCOSE BLDC GLUCOMTR-MCNC: 311 MG/DL — HIGH (ref 70–99)
GLUCOSE SERPL-MCNC: 205 MG/DL — HIGH (ref 70–99)
GRAM STN FLD: ABNORMAL
HCT VFR BLD CALC: 26.6 % — LOW (ref 39–50)
HGB BLD-MCNC: 8.4 G/DL — LOW (ref 13–17)
LEGIONELLA AG UR QL: NEGATIVE — SIGNIFICANT CHANGE UP
MCHC RBC-ENTMCNC: 28.7 PG — SIGNIFICANT CHANGE UP (ref 27–34)
MCHC RBC-ENTMCNC: 31.6 G/DL — LOW (ref 32–36)
MCV RBC AUTO: 90.8 FL — SIGNIFICANT CHANGE UP (ref 80–100)
PLATELET # BLD AUTO: 313 K/UL — SIGNIFICANT CHANGE UP (ref 150–400)
POTASSIUM SERPL-MCNC: 4.7 MMOL/L — SIGNIFICANT CHANGE UP (ref 3.5–5.3)
POTASSIUM SERPL-SCNC: 4.7 MMOL/L — SIGNIFICANT CHANGE UP (ref 3.5–5.3)
RBC # BLD: 2.93 M/UL — LOW (ref 4.2–5.8)
RBC # FLD: 15.8 % — HIGH (ref 10.3–14.5)
S PNEUM AG UR QL: NEGATIVE — SIGNIFICANT CHANGE UP
SODIUM SERPL-SCNC: 138 MMOL/L — SIGNIFICANT CHANGE UP (ref 135–145)
SPECIMEN SOURCE: SIGNIFICANT CHANGE UP
WBC # BLD: 16.59 K/UL — HIGH (ref 3.8–10.5)
WBC # FLD AUTO: 16.59 K/UL — HIGH (ref 3.8–10.5)

## 2024-12-12 PROCEDURE — 93306 TTE W/DOPPLER COMPLETE: CPT | Mod: 26

## 2024-12-12 PROCEDURE — 99233 SBSQ HOSP IP/OBS HIGH 50: CPT

## 2024-12-12 PROCEDURE — 99232 SBSQ HOSP IP/OBS MODERATE 35: CPT

## 2024-12-12 RX ORDER — INSULIN GLARGINE 100 [IU]/ML
40 INJECTION, SOLUTION SUBCUTANEOUS AT BEDTIME
Refills: 0 | Status: DISCONTINUED | OUTPATIENT
Start: 2024-12-12 | End: 2024-12-18

## 2024-12-12 RX ADMIN — ROSUVASTATIN CALCIUM 20 MILLIGRAM(S): 5 TABLET, FILM COATED ORAL at 22:42

## 2024-12-12 RX ADMIN — PIPERACILLIN SODIUM AND TAZOBACTAM SODIUM 25 GRAM(S): 4; .5 INJECTION, POWDER, LYOPHILIZED, FOR SOLUTION INTRAVENOUS at 06:18

## 2024-12-12 RX ADMIN — Medication 5000 UNIT(S): at 17:28

## 2024-12-12 RX ADMIN — Medication 2: at 08:17

## 2024-12-12 RX ADMIN — DEXAMETHASONE 6 MILLIGRAM(S): 1.5 TABLET ORAL at 06:18

## 2024-12-12 RX ADMIN — SODIUM BICARBONATE 1300 MILLIGRAM(S): 84 INJECTION, SOLUTION INTRAVENOUS at 22:42

## 2024-12-12 RX ADMIN — SODIUM BICARBONATE 1300 MILLIGRAM(S): 84 INJECTION, SOLUTION INTRAVENOUS at 06:18

## 2024-12-12 RX ADMIN — Medication 8: at 17:28

## 2024-12-12 RX ADMIN — FAMOTIDINE 10 MILLIGRAM(S): 20 TABLET, FILM COATED ORAL at 11:59

## 2024-12-12 RX ADMIN — INSULIN GLARGINE 40 UNIT(S): 100 INJECTION, SOLUTION SUBCUTANEOUS at 22:42

## 2024-12-12 RX ADMIN — Medication 5000 UNIT(S): at 06:18

## 2024-12-12 RX ADMIN — Medication 81 MILLIGRAM(S): at 12:16

## 2024-12-12 RX ADMIN — PIPERACILLIN SODIUM AND TAZOBACTAM SODIUM 25 GRAM(S): 4; .5 INJECTION, POWDER, LYOPHILIZED, FOR SOLUTION INTRAVENOUS at 17:29

## 2024-12-12 RX ADMIN — Medication 5 UNIT(S): at 08:16

## 2024-12-12 RX ADMIN — Medication 5 UNIT(S): at 17:28

## 2024-12-12 RX ADMIN — PANTOPRAZOLE SODIUM 40 MILLIGRAM(S): 40 TABLET, DELAYED RELEASE ORAL at 06:18

## 2024-12-12 RX ADMIN — Medication 5 UNIT(S): at 11:58

## 2024-12-12 RX ADMIN — Medication 6: at 11:58

## 2024-12-12 RX ADMIN — REMDESIVIR 200 MILLIGRAM(S): 100 INJECTION, POWDER, LYOPHILIZED, FOR SOLUTION INTRAVENOUS at 06:18

## 2024-12-12 NOTE — PROGRESS NOTE ADULT - SUBJECTIVE AND OBJECTIVE BOX
JADE MESSINA  [id number]        Chief Complaint: follow up PNA/CP/COVID      Subjective: no recurrent pain      24 hour Tele: SR         acetaminophen     Tablet .. 650 milliGRAM(s) Oral every 6 hours PRN  aspirin enteric coated 81 milliGRAM(s) Oral daily  benzonatate 100 milliGRAM(s) Oral every 8 hours PRN  dexAMETHasone  Injectable 6 milliGRAM(s) IV Push daily  dextrose 5%. 1000 milliLiter(s) IV Continuous <Continuous>  dextrose 5%. 1000 milliLiter(s) IV Continuous <Continuous>  dextrose 50% Injectable 25 Gram(s) IV Push once  dextrose 50% Injectable 12.5 Gram(s) IV Push once  dextrose 50% Injectable 25 Gram(s) IV Push once  dextrose Oral Gel 15 Gram(s) Oral once PRN  famotidine    Tablet 10 milliGRAM(s) Oral daily  glucagon  Injectable 1 milliGRAM(s) IntraMuscular once  guaifenesin/dextromethorphan Oral Liquid 10 milliLiter(s) Oral every 4 hours PRN  heparin   Injectable 5000 Unit(s) SubCutaneous every 12 hours  insulin glargine Injectable (LANTUS) 35 Unit(s) SubCutaneous at bedtime  insulin lispro (ADMELOG) corrective regimen sliding scale   SubCutaneous three times a day before meals  insulin lispro Injectable (ADMELOG) 5 Unit(s) SubCutaneous three times a day before meals  melatonin 3 milliGRAM(s) Oral at bedtime PRN  ondansetron Injectable 4 milliGRAM(s) IV Push every 6 hours PRN  pantoprazole    Tablet 40 milliGRAM(s) Oral before breakfast  piperacillin/tazobactam IVPB.. 3.375 Gram(s) IV Intermittent every 12 hours  remdesivir  IVPB   IV Intermittent   remdesivir  IVPB 100 milliGRAM(s) IV Intermittent every 24 hours  rosuvastatin 20 milliGRAM(s) Oral at bedtime  sodium bicarbonate 1300 milliGRAM(s) Oral three times a day          Physical Exam:  T(C): 36.7 (12-12-24 @ 04:28), Max: 36.7 (12-12-24 @ 04:28)  HR: 77 (12-12-24 @ 04:28) (77 - 98)  BP: 110/70 (12-12-24 @ 04:28) (110/70 - 135/68)  RR: 18 (12-12-24 @ 04:28) (18 - 18)  SpO2: 95% (12-12-24 @ 04:28) (95% - 100%)  Wt(kg): --  General: Comfortable in NAD  HEENT: MMM, sclera anicteric  Resp: CTA bilaterally  CVS: nl s1s2, rrr, no s3/JVD  Abd: soft, NT, ND, BS+  Ext: No c/c/e  Neuro A&O x3  Psych: Normal affect    I&O's Summary    11 Dec 2024 07:01  -  12 Dec 2024 07:00  --------------------------------------------------------  IN: 500 mL / OUT: 500 mL / NET: 0 mL          Labs:   12 Dec 2024 05:24    138    |  106    |  53.3   ----------------------------<  205    4.7     |  19.0   |  3.19     Ca    8.6        12 Dec 2024 05:24    TPro  6.3    /  Alb  3.2    /  TBili  <0.2   /  DBili  x      /  AST  18     /  ALT  18     /  AlkPhos  87     11 Dec 2024 02:03                          8.4    16.59 )-----------( 313      ( 12 Dec 2024 05:24 )             26.6     PT/INR - ( 10 Dec 2024 12:25 )   PT: 12.8 sec;   INR: 1.13 ratio         PTT - ( 10 Dec 2024 12:25 )  PTT:31.2 sec          Echocardiography 4/4/24: Normal left ventricular size and function, mild left ventricular hypertrophy, ejection fraction 55-60%, diastolic dysfunction is noted. There is mild mitral regurgitation, mild aortic regurgitation, aortic root is dilated at 3.8 cm.     Carotid duplex scans 10/10/24: Patent left internal carotid stent, moderate (50% to 65%) right internal carotid artery narrowings.      Assessment:  74 y.o. male with hx of coronary artery disease s/p drug-eluting stent therapy of the proximal LAD in November 2022 , carotid artery disease (80% left carotid stenosis), peripheral arterial disease right SFA stent therapy at that time and then subsequently underwent left SFA stent therapy in 2023, hypertension, hyperlipidemia and lung cancer treated with chemotherapy and radiation and apparently has no evidence of disease at this time. He presented to [facility] in February 2024 with a left occipital stroke, confirmed by MRI. Patient comes to the ER complaining of chest pain. Patient was at Dr. Rivas PCP office for flu-like symptoms after recent dx of septic pneumonia 1 months ago & hospital stay at [place]. Patient was diagnosed with covid today at PCP. Patient had sudden, crushing, central nonradiating chest pain, PCP gave 2 sprays of nitro. Patient then had seizure-like acitivity per family, likely vasovagaled, BP was 70s/40s, hypoxic. Patient had no post-ictal state. Patient states no longer has chest pain.   -Trops all negative.  -Echo still pending     Plan: (TO BE SEEN)   1. F/u echo.  2. If echo unchanged from prior no additional CV w/u now and outpatient follow up with Dr Solano, jemal stress testing then   3. Call with questions, thank you!   4.           Carlos Manuel Singh MD JADE MESSINA  [id number]        Chief Complaint: follow up PNA/CP/COVID      Subjective: no recurrent pain      24 hour Tele: SR , no events         acetaminophen     Tablet .. 650 milliGRAM(s) Oral every 6 hours PRN  aspirin enteric coated 81 milliGRAM(s) Oral daily  benzonatate 100 milliGRAM(s) Oral every 8 hours PRN  dexAMETHasone  Injectable 6 milliGRAM(s) IV Push daily  dextrose 5%. 1000 milliLiter(s) IV Continuous <Continuous>  dextrose 5%. 1000 milliLiter(s) IV Continuous <Continuous>  dextrose 50% Injectable 25 Gram(s) IV Push once  dextrose 50% Injectable 12.5 Gram(s) IV Push once  dextrose 50% Injectable 25 Gram(s) IV Push once  dextrose Oral Gel 15 Gram(s) Oral once PRN  famotidine    Tablet 10 milliGRAM(s) Oral daily  glucagon  Injectable 1 milliGRAM(s) IntraMuscular once  guaifenesin/dextromethorphan Oral Liquid 10 milliLiter(s) Oral every 4 hours PRN  heparin   Injectable 5000 Unit(s) SubCutaneous every 12 hours  insulin glargine Injectable (LANTUS) 35 Unit(s) SubCutaneous at bedtime  insulin lispro (ADMELOG) corrective regimen sliding scale   SubCutaneous three times a day before meals  insulin lispro Injectable (ADMELOG) 5 Unit(s) SubCutaneous three times a day before meals  melatonin 3 milliGRAM(s) Oral at bedtime PRN  ondansetron Injectable 4 milliGRAM(s) IV Push every 6 hours PRN  pantoprazole    Tablet 40 milliGRAM(s) Oral before breakfast  piperacillin/tazobactam IVPB.. 3.375 Gram(s) IV Intermittent every 12 hours  remdesivir  IVPB   IV Intermittent   remdesivir  IVPB 100 milliGRAM(s) IV Intermittent every 24 hours  rosuvastatin 20 milliGRAM(s) Oral at bedtime  sodium bicarbonate 1300 milliGRAM(s) Oral three times a day          Physical Exam:  T(C): 36.7 (12-12-24 @ 04:28), Max: 36.7 (12-12-24 @ 04:28)  HR: 77 (12-12-24 @ 04:28) (77 - 98)  BP: 110/70 (12-12-24 @ 04:28) (110/70 - 135/68)  RR: 18 (12-12-24 @ 04:28) (18 - 18)  SpO2: 95% (12-12-24 @ 04:28) (95% - 100%)  Wt(kg): --  General: Comfortable in NAD  HEENT: MMM, sclera anicteric  Resp: CTA bilaterally  CVS: nl s1s2, rrr, no s3/JVD  Abd: soft, NT, ND, BS+  Ext: No c/c/e  Neuro A&O x3  Psych: Normal affect    I&O's Summary    11 Dec 2024 07:01  -  12 Dec 2024 07:00  --------------------------------------------------------  IN: 500 mL / OUT: 500 mL / NET: 0 mL          Labs:   12 Dec 2024 05:24    138    |  106    |  53.3   ----------------------------<  205    4.7     |  19.0   |  3.19     Ca    8.6        12 Dec 2024 05:24    TPro  6.3    /  Alb  3.2    /  TBili  <0.2   /  DBili  x      /  AST  18     /  ALT  18     /  AlkPhos  87     11 Dec 2024 02:03                          8.4    16.59 )-----------( 313      ( 12 Dec 2024 05:24 )             26.6     PT/INR - ( 10 Dec 2024 12:25 )   PT: 12.8 sec;   INR: 1.13 ratio         PTT - ( 10 Dec 2024 12:25 )  PTT:31.2 sec          Echocardiography 4/4/24: Normal left ventricular size and function, mild left ventricular hypertrophy, ejection fraction 55-60%, diastolic dysfunction is noted. There is mild mitral regurgitation, mild aortic regurgitation, aortic root is dilated at 3.8 cm.     Carotid duplex scans 10/10/24: Patent left internal carotid stent, moderate (50% to 65%) right internal carotid artery narrowings.      Assessment:  74 y.o. male with hx of coronary artery disease s/p drug-eluting stent therapy of the proximal LAD in November 2022 , carotid artery disease (80% left carotid stenosis), peripheral arterial disease right SFA stent therapy at that time and then subsequently underwent left SFA stent therapy in 2023, hypertension, hyperlipidemia and lung cancer treated with chemotherapy and radiation and apparently has no evidence of disease at this time. He presented to [facility] in February 2024 with a left occipital stroke, confirmed by MRI. Patient comes to the ER complaining of chest pain. Patient was at Dr. Rivas PCP office for flu-like symptoms after recent dx of septic pneumonia 1 months ago & hospital stay at [place]. Patient was diagnosed with covid today at PCP. Patient had sudden, crushing, central nonradiating chest pain, PCP gave 2 sprays of nitro. Patient then had seizure-like acitivity per family, likely vasovagaled, BP was 70s/40s, hypoxic. Patient had no post-ictal state. Patient states no longer has chest pain.   -Trops all negative. CP non-cardiac  -Echo still pending     Plan:   1. F/u echo.  2. If echo unchanged from prior no additional CV w/u now and outpatient follow up with Dr Solano, consider stress testing then   3. Call with questions, thank you!             Carlos Manuel Singh MD

## 2024-12-12 NOTE — PROGRESS NOTE ADULT - ASSESSMENT
74 y.o. male with hx of coronary artery disease s/p drug-eluting stent therapy of the proximal LAD in November 2022 , carotid artery disease (80% left carotid stenosis), peripheral arterial disease right SFA stent therapy at that time and then subsequently underwent left SFA stent therapy in 2023, hypertension, hyperlipidemia and lung cancer treated with chemotherapy and radiation and apparently has no evidence of disease at this time. He presented to [facility] in February 2024 with a left occipital stroke. Had recent dx of septic pneumonia 1 months ago & hospital stay at [place]. Patient was diagnosed with Covid today at PCP. Patient had sudden, crushing, central non radiating chest pain, PCP gave 2 sprays of nitro. Patient then had seizure-like acitivity per family, likely vasovagaled, BP was 70s/40s, hypoxic. Patient had no post-ictal state, patient is A&Ox3. Admitted with COVID infection and septic shock.    # Initially in triage had shock- 79/46mmHg, 132 bpm, 24/mt = septic shock  # Due to COVID infection  # Due to possible superimposed bacterial pneumonia  # vs shock sec to vasovagal episode  Remedesevir, Dexamethasone, PPI  NC O2 as needed. On room air now  If remains on room air, can stop Remdesevir at 3 days vs completing 5 day course  Hemodynamically stable now, shock resolved. WBC normalized  On Zosyn as well for possible superimposed bacterial pneumonia  CXR reviewed- RLL and mid lobe findings unclear if related to lung cancer history.  Complete Zosyn x 5-7 days  MRSA PCR negative, vanc dc  Has leukocytosis today    # Chest pain- resolved  seen by cardiology  atypical CP  EKG NSR. QTc 480s non acute otherwise  Trops x 3 negative  FU TTE, if similar to previous echo, then outpatient fu. IF changed, then will need NST.    # CAMRON on CKD vs CKD Stg 4; NAGMA  compared to 2023 GFR still in Still 4 CKD range.  avoid nephrotoxics  renally dose meds  renal USG negative for obstruction, Kidneys WNL  on PO bicarb    # AOCD sec to chemo and CKD  Hgb 8-9 range  BT if hgb 7 or less  can f/u renal in o/p and consider Epo     # Hx DM with hyperglycemia; HgbA1c is 8.8  -likely exacerbated by steroid use  -increase Lantus to 35, add premeal, c/w ISS    # Hx CAD, PAD  -Resume ASA, statin   -B-blocker on hold due to hypotension on arrival    # Hx lung cancer  -Details of current therapy unknown   -Outpatient oncology follow up    # Prophylactic measure  -Heparin 5000 units BID    Dispo: tbd; medically active pending IV abx, Decadron/Remdesivir. Likely 2-3 days. 74 y.o. male with hx of coronary artery disease s/p drug-eluting stent therapy of the proximal LAD in November 2022 , carotid artery disease (80% left carotid stenosis), peripheral arterial disease right SFA stent therapy at that time and then subsequently underwent left SFA stent therapy in 2023, hypertension, hyperlipidemia and lung cancer treated with chemotherapy and radiation and apparently has no evidence of disease at this time. He presented to [facility] in February 2024 with a left occipital stroke. Had recent dx of septic pneumonia 1 months ago & hospital stay at [place]. Patient was diagnosed with Covid today at PCP. Patient had sudden, crushing, central non radiating chest pain, PCP gave 2 sprays of nitro. Patient then had seizure-like acitivity per family, likely vasovagaled, BP was 70s/40s, hypoxic. Patient had no post-ictal state, patient is A&Ox3. Admitted with COVID infection and septic shock.    # Initially in triage had shock- 79/46mmHg, 132 bpm, 24/mt = septic shock  # Due to COVID infection  # Due to possible superimposed bacterial pneumonia  # vs shock sec to vasovagal episode  Remedesevir, Dexamethasone, PPI  NC O2 as needed. On room air now  If remains on room air, can stop Remdesevir at 3 days vs completing 5 day course  Hemodynamically stable now, shock resolved. WBC normalized  On Zosyn as well for possible superimposed bacterial pneumonia  CXR reviewed- RLL and mid lobe findings unclear if related to lung cancer history.  Complete Zosyn x 5-7 days  MRSA PCR negative, vanc dc  Has leukocytosis today    # Chest pain- resolved  seen by cardiology  atypical CP  EKG NSR. QTc 480s non acute otherwise  Trops x 3 negative  FU TTE, if similar to previous echo, then outpatient fu. IF changed, then will need NST.    # CAMRON on CKD vs CKD Stg 4; NAGMA  compared to 2023 GFR still in Still 4 CKD range.  avoid nephrotoxics  renally dose meds  renal USG negative for obstruction, Kidneys WNL  on PO bicarb    # AOCD sec to chemo and CKD  Hgb 8-9 range  BT if hgb 7 or less  can f/u renal in o/p and consider Epo     # Hx DM with hyperglycemia; HgbA1c is 8.8  -likely exacerbated by steroid use  -increase Lantus to 35, add premeal, c/w ISS    # Hx CAD, PAD  -Resume ASA, statin   -B-blocker on hold due to hypotension on arrival    # Hx lung cancer  -Details of current therapy unknown   -Outpatient oncology follow up    # Prophylactic measure  -Heparin 5000 units BID    Dispo: tbd; medically active pending IV abx, Decadron/Remdesivir. Likely 2-3 days.    D/w Yuridia Aj (wife) by phone [phone]

## 2024-12-12 NOTE — PROGRESS NOTE ADULT - SUBJECTIVE AND OBJECTIVE BOX
Patient is a 74y old  Male who presents with a chief complaint of Sepsis due to bacterial PNA and COVID, hypotension (12 Dec 2024 08:52)      SUBJECTIVE / OVERNIGHT EVENTS: Seen and examined. Feels ok. Seen walking to bathroom without issue. Had mild diarrhea. Denies chest pain, SOB, abd pain, N/V, fever.     MEDICATIONS  (STANDING):  aspirin enteric coated 81 milliGRAM(s) Oral daily  dexAMETHasone  Injectable 6 milliGRAM(s) IV Push daily  dextrose 5%. 1000 milliLiter(s) (50 mL/Hr) IV Continuous <Continuous>  dextrose 5%. 1000 milliLiter(s) (100 mL/Hr) IV Continuous <Continuous>  dextrose 50% Injectable 25 Gram(s) IV Push once  dextrose 50% Injectable 12.5 Gram(s) IV Push once  dextrose 50% Injectable 25 Gram(s) IV Push once  famotidine    Tablet 10 milliGRAM(s) Oral daily  glucagon  Injectable 1 milliGRAM(s) IntraMuscular once  heparin   Injectable 5000 Unit(s) SubCutaneous every 12 hours  insulin glargine Injectable (LANTUS) 35 Unit(s) SubCutaneous at bedtime  insulin lispro (ADMELOG) corrective regimen sliding scale   SubCutaneous three times a day before meals  insulin lispro Injectable (ADMELOG) 5 Unit(s) SubCutaneous three times a day before meals  pantoprazole    Tablet 40 milliGRAM(s) Oral before breakfast  piperacillin/tazobactam IVPB.. 3.375 Gram(s) IV Intermittent every 12 hours  remdesivir  IVPB   IV Intermittent   remdesivir  IVPB 100 milliGRAM(s) IV Intermittent every 24 hours  rosuvastatin 20 milliGRAM(s) Oral at bedtime  sodium bicarbonate 1300 milliGRAM(s) Oral three times a day    MEDICATIONS  (PRN):  acetaminophen     Tablet .. 650 milliGRAM(s) Oral every 6 hours PRN Temp greater or equal to 38C (100.4F), Mild Pain (1 - 3), Moderate Pain (4 - 6)  benzonatate 100 milliGRAM(s) Oral every 8 hours PRN Cough  dextrose Oral Gel 15 Gram(s) Oral once PRN Blood Glucose LESS THAN 70 milliGRAM(s)/deciliter  guaifenesin/dextromethorphan Oral Liquid 10 milliLiter(s) Oral every 4 hours PRN Cough  melatonin 3 milliGRAM(s) Oral at bedtime PRN Insomnia  ondansetron Injectable 4 milliGRAM(s) IV Push every 6 hours PRN Nausea and/or Vomiting        I&O's Summary    11 Dec 2024 07:01  -  12 Dec 2024 07:00  --------------------------------------------------------  IN: 500 mL / OUT: 500 mL / NET: 0 mL        PHYSICAL EXAM:  Vital Signs Last 24 Hrs  T(C): 36.7 (12 Dec 2024 10:49), Max: 36.7 (12 Dec 2024 04:28)  T(F): 98.1 (12 Dec 2024 10:49), Max: 98.1 (12 Dec 2024 04:28)  HR: 98 (12 Dec 2024 10:49) (77 - 98)  BP: 133/74 (12 Dec 2024 10:49) (110/70 - 135/68)  BP(mean): --  RR: 19 (12 Dec 2024 10:49) (18 - 19)  SpO2: 94% (12 Dec 2024 10:49) (94% - 100%)    Parameters below as of 12 Dec 2024 04:28  Patient On (Oxygen Delivery Method): room air        PHYSICAL EXAM-  GENERAL: elderly male, not in acute distress, saturating well on room air at 98%  EYES:  Clear conjunctiva, extraocular movement intact  ENT: Moist mucous membranes  RESP: no WOB, bilateral rhonchi which clears with cough  CV: Regular rate and rhythm, no murmurs appreciated, no lower extremity edema  GI: Soft, non-tender, non-distended  NEURO: Awake, alert, conversant, able to move all extremities, light touch sensation grossly intact  PSYCH: calm and cooperative  SKIN: No rash or lesions, warm and dry    LABS:                        8.4    16.59 )-----------( 313      ( 12 Dec 2024 05:24 )             26.6     12-12    138  |  106  |  53.3[H]  ----------------------------<  205[H]  4.7   |  19.0[L]  |  3.19[H]    Ca    8.6      12 Dec 2024 05:24    TPro  6.3[L]  /  Alb  3.2[L]  /  TBili  <0.2[L]  /  DBili  x   /  AST  18  /  ALT  18  /  AlkPhos  87  12-11    PT/INR - ( 10 Dec 2024 12:25 )   PT: 12.8 sec;   INR: 1.13 ratio         PTT - ( 10 Dec 2024 12:25 )  PTT:31.2 sec      Urinalysis Basic - ( 12 Dec 2024 05:24 )    Color: x / Appearance: x / SG: x / pH: x  Gluc: 205 mg/dL / Ketone: x  / Bili: x / Urobili: x   Blood: x / Protein: x / Nitrite: x   Leuk Esterase: x / RBC: x / WBC x   Sq Epi: x / Non Sq Epi: x / Bacteria: x        Culture - Blood (collected 10 Dec 2024 12:25)  Source: .Blood BLOOD  Preliminary Report (11 Dec 2024 19:02):    No growth at 24 hours      CAPILLARY BLOOD GLUCOSE      POCT Blood Glucose.: 155 mg/dL (12 Dec 2024 08:15)  POCT Blood Glucose.: 178 mg/dL (11 Dec 2024 21:14)  POCT Blood Glucose.: 188 mg/dL (11 Dec 2024 17:08)  POCT Blood Glucose.: 357 mg/dL (11 Dec 2024 11:49)        RADIOLOGY & ADDITIONAL TESTS:  Results Reviewed:   Imaging Personally Reviewed:  Electrocardiogram Personally Reviewed:

## 2024-12-13 LAB
ALBUMIN SERPL ELPH-MCNC: 3.1 G/DL — LOW (ref 3.3–5.2)
ALP SERPL-CCNC: 69 U/L — SIGNIFICANT CHANGE UP (ref 40–120)
ALT FLD-CCNC: 17 U/L — SIGNIFICANT CHANGE UP
ANION GAP SERPL CALC-SCNC: 13 MMOL/L — SIGNIFICANT CHANGE UP (ref 5–17)
AST SERPL-CCNC: 16 U/L — SIGNIFICANT CHANGE UP
BILIRUB SERPL-MCNC: <0.2 MG/DL — LOW (ref 0.4–2)
BUN SERPL-MCNC: 53 MG/DL — HIGH (ref 8–20)
CALCIUM SERPL-MCNC: 8.8 MG/DL — SIGNIFICANT CHANGE UP (ref 8.4–10.5)
CHLORIDE SERPL-SCNC: 107 MMOL/L — SIGNIFICANT CHANGE UP (ref 96–108)
CO2 SERPL-SCNC: 21 MMOL/L — LOW (ref 22–29)
CREAT SERPL-MCNC: 2.82 MG/DL — HIGH (ref 0.5–1.3)
CULTURE RESULTS: ABNORMAL
EGFR: 23 ML/MIN/1.73M2 — LOW
GLUCOSE BLDC GLUCOMTR-MCNC: 120 MG/DL — HIGH (ref 70–99)
GLUCOSE BLDC GLUCOMTR-MCNC: 318 MG/DL — HIGH (ref 70–99)
GLUCOSE BLDC GLUCOMTR-MCNC: 403 MG/DL — HIGH (ref 70–99)
GLUCOSE BLDC GLUCOMTR-MCNC: 418 MG/DL — HIGH (ref 70–99)
GLUCOSE BLDC GLUCOMTR-MCNC: 448 MG/DL — HIGH (ref 70–99)
GLUCOSE SERPL-MCNC: 153 MG/DL — HIGH (ref 70–99)
HCT VFR BLD CALC: 27.8 % — LOW (ref 39–50)
HGB BLD-MCNC: 8.8 G/DL — LOW (ref 13–17)
MAGNESIUM SERPL-MCNC: 2 MG/DL — SIGNIFICANT CHANGE UP (ref 1.6–2.6)
MCHC RBC-ENTMCNC: 28.4 PG — SIGNIFICANT CHANGE UP (ref 27–34)
MCHC RBC-ENTMCNC: 31.7 G/DL — LOW (ref 32–36)
MCV RBC AUTO: 89.7 FL — SIGNIFICANT CHANGE UP (ref 80–100)
PHOSPHATE SERPL-MCNC: 3.4 MG/DL — SIGNIFICANT CHANGE UP (ref 2.4–4.7)
PLATELET # BLD AUTO: 343 K/UL — SIGNIFICANT CHANGE UP (ref 150–400)
POTASSIUM SERPL-MCNC: 4.2 MMOL/L — SIGNIFICANT CHANGE UP (ref 3.5–5.3)
POTASSIUM SERPL-SCNC: 4.2 MMOL/L — SIGNIFICANT CHANGE UP (ref 3.5–5.3)
PROT SERPL-MCNC: 6.1 G/DL — LOW (ref 6.6–8.7)
RBC # BLD: 3.1 M/UL — LOW (ref 4.2–5.8)
RBC # FLD: 15.9 % — HIGH (ref 10.3–14.5)
SODIUM SERPL-SCNC: 141 MMOL/L — SIGNIFICANT CHANGE UP (ref 135–145)
SPECIMEN SOURCE: SIGNIFICANT CHANGE UP
WBC # BLD: 14.89 K/UL — HIGH (ref 3.8–10.5)
WBC # FLD AUTO: 14.89 K/UL — HIGH (ref 3.8–10.5)

## 2024-12-13 PROCEDURE — 99233 SBSQ HOSP IP/OBS HIGH 50: CPT

## 2024-12-13 RX ORDER — ALPRAZOLAM 0.5 MG
0.25 TABLET ORAL ONCE
Refills: 0 | Status: DISCONTINUED | OUTPATIENT
Start: 2024-12-13 | End: 2024-12-13

## 2024-12-13 RX ORDER — INSULIN REG, HUM S-S BUFF 100/ML
2 VIAL (ML) INJECTION ONCE
Refills: 0 | Status: COMPLETED | OUTPATIENT
Start: 2024-12-13 | End: 2024-12-13

## 2024-12-13 RX ADMIN — Medication 6 UNIT(S): at 22:49

## 2024-12-13 RX ADMIN — Medication 5000 UNIT(S): at 17:29

## 2024-12-13 RX ADMIN — DEXAMETHASONE 6 MILLIGRAM(S): 1.5 TABLET ORAL at 06:41

## 2024-12-13 RX ADMIN — Medication 5 UNIT(S): at 16:44

## 2024-12-13 RX ADMIN — Medication 5 UNIT(S): at 08:10

## 2024-12-13 RX ADMIN — Medication 8: at 11:11

## 2024-12-13 RX ADMIN — FAMOTIDINE 10 MILLIGRAM(S): 20 TABLET, FILM COATED ORAL at 11:10

## 2024-12-13 RX ADMIN — SODIUM BICARBONATE 1300 MILLIGRAM(S): 84 INJECTION, SOLUTION INTRAVENOUS at 21:52

## 2024-12-13 RX ADMIN — SODIUM BICARBONATE 1300 MILLIGRAM(S): 84 INJECTION, SOLUTION INTRAVENOUS at 06:41

## 2024-12-13 RX ADMIN — Medication 0.25 MILLIGRAM(S): at 23:26

## 2024-12-13 RX ADMIN — REMDESIVIR 200 MILLIGRAM(S): 100 INJECTION, POWDER, LYOPHILIZED, FOR SOLUTION INTRAVENOUS at 06:41

## 2024-12-13 RX ADMIN — Medication 5 UNIT(S): at 11:10

## 2024-12-13 RX ADMIN — Medication 5000 UNIT(S): at 06:41

## 2024-12-13 RX ADMIN — Medication 81 MILLIGRAM(S): at 11:10

## 2024-12-13 RX ADMIN — PIPERACILLIN SODIUM AND TAZOBACTAM SODIUM 25 GRAM(S): 4; .5 INJECTION, POWDER, LYOPHILIZED, FOR SOLUTION INTRAVENOUS at 17:29

## 2024-12-13 RX ADMIN — SODIUM BICARBONATE 1300 MILLIGRAM(S): 84 INJECTION, SOLUTION INTRAVENOUS at 13:02

## 2024-12-13 RX ADMIN — PIPERACILLIN SODIUM AND TAZOBACTAM SODIUM 25 GRAM(S): 4; .5 INJECTION, POWDER, LYOPHILIZED, FOR SOLUTION INTRAVENOUS at 06:42

## 2024-12-13 RX ADMIN — ROSUVASTATIN CALCIUM 20 MILLIGRAM(S): 5 TABLET, FILM COATED ORAL at 21:53

## 2024-12-13 RX ADMIN — PANTOPRAZOLE SODIUM 40 MILLIGRAM(S): 40 TABLET, DELAYED RELEASE ORAL at 06:41

## 2024-12-13 RX ADMIN — INSULIN GLARGINE 40 UNIT(S): 100 INJECTION, SOLUTION SUBCUTANEOUS at 21:52

## 2024-12-13 RX ADMIN — Medication 12: at 16:45

## 2024-12-13 NOTE — PROGRESS NOTE ADULT - ASSESSMENT
74 y.o. male with hx of coronary artery disease s/p drug-eluting stent therapy of the proximal LAD in November 2022 , carotid artery disease (80% left carotid stenosis), peripheral arterial disease right SFA stent therapy at that time and then subsequently underwent left SFA stent therapy in 2023, hypertension, hyperlipidemia and lung cancer treated with chemotherapy and radiation and apparently has no evidence of disease at this time. He presented to [facility] in February 2024 with a left occipital stroke. Had recent dx of septic pneumonia 1 months ago & hospital stay at [place]. Patient was diagnosed with Covid today at PCP. Patient had sudden, crushing, central non radiating chest pain, PCP gave 2 sprays of nitro. Patient then had seizure-like acitivity per family, likely vasovagaled, BP was 70s/40s, hypoxic. Patient had no post-ictal state, patient is A&Ox3. Admitted with COVID infection and septic shock. Started on IV covid rx and IV abx.     # Initially in triage had shock- 79/46mmHg, 132 bpm, 24/mt = septic shock  # Due to COVID infection  # Due to possible superimposed bacterial pneumonia  # vs shock sec to vasovagal episode  Remedesevir, Dexamethasone, PPI  NC O2 as needed. On room air now  If remains on room air, can stop Remdesevir at 3 days vs completing 5 day course  Hemodynamically stable now, shock resolved. WBC normalized  On Zosyn as well for possible superimposed bacterial pneumonia  CXR reviewed- RLL and mid lobe findings unclear if related to lung cancer history.  Complete Zosyn x 5-7 days until 12/17  MRSA PCR negative, vanc dc  Has leukocytosis with improvement, afebrile     # Chest pain- resolved  seen by cardiology  atypical CP  EKG NSR. QTc 480s non acute otherwise  Trops x 3 negative  FU TTE, if similar to previous echo, then outpatient fu. IF changed, then will need NST.    # CAMRON on CKD vs CKD Stg 4; NAGMA (improving)  compared to 2023 GFR still in Still 4 CKD range.  avoid nephrotoxics  renally dose meds  renal USG negative for obstruction, Kidneys WNL  on PO bicarb    # AOCD sec to chemo and CKD  Hgb 8-9 range  BT if hgb 7 or less  can f/u renal in o/p and consider Epo     # Hx DM with hyperglycemia; HgbA1c is 8.8  -likely exacerbated by steroid use  -Increase Lantus to 40, Cw premeal, c/w ISS    # Hx CAD, PAD  -Resume ASA, statin   -B-blocker on hold due to hypotension on arrival    # Hx lung cancer  -Details of current therapy unknown   -Outpatient oncology follow up    # Prophylactic measure  -Heparin 5000 units BID    Dispo: tbd; medically active pending IV abx, Decadron/Remdesivir. Likely 2-3 days.

## 2024-12-13 NOTE — PROGRESS NOTE ADULT - SUBJECTIVE AND OBJECTIVE BOX
Patient is a 74y old  Male who presents with a chief complaint of Sepsis due to bacterial PNA and COVID, hypotension (12 Dec 2024 11:43)      SUBJECTIVE / OVERNIGHT EVENTS: Seen and examined. Feels ok. Denies chest pain, SOB, abd pain, N/V, fever, chills.     MEDICATIONS  (STANDING):  aspirin enteric coated 81 milliGRAM(s) Oral daily  dexAMETHasone  Injectable 6 milliGRAM(s) IV Push daily  dextrose 5%. 1000 milliLiter(s) (50 mL/Hr) IV Continuous <Continuous>  dextrose 5%. 1000 milliLiter(s) (100 mL/Hr) IV Continuous <Continuous>  dextrose 50% Injectable 25 Gram(s) IV Push once  dextrose 50% Injectable 12.5 Gram(s) IV Push once  dextrose 50% Injectable 25 Gram(s) IV Push once  famotidine    Tablet 10 milliGRAM(s) Oral daily  glucagon  Injectable 1 milliGRAM(s) IntraMuscular once  heparin   Injectable 5000 Unit(s) SubCutaneous every 12 hours  insulin glargine Injectable (LANTUS) 40 Unit(s) SubCutaneous at bedtime  insulin lispro (ADMELOG) corrective regimen sliding scale   SubCutaneous three times a day before meals  insulin lispro Injectable (ADMELOG) 5 Unit(s) SubCutaneous three times a day before meals  pantoprazole    Tablet 40 milliGRAM(s) Oral before breakfast  piperacillin/tazobactam IVPB.. 3.375 Gram(s) IV Intermittent every 12 hours  remdesivir  IVPB   IV Intermittent   remdesivir  IVPB 100 milliGRAM(s) IV Intermittent every 24 hours  rosuvastatin 20 milliGRAM(s) Oral at bedtime  sodium bicarbonate 1300 milliGRAM(s) Oral three times a day    MEDICATIONS  (PRN):  acetaminophen     Tablet .. 650 milliGRAM(s) Oral every 6 hours PRN Temp greater or equal to 38C (100.4F), Mild Pain (1 - 3), Moderate Pain (4 - 6)  benzonatate 100 milliGRAM(s) Oral every 8 hours PRN Cough  dextrose Oral Gel 15 Gram(s) Oral once PRN Blood Glucose LESS THAN 70 milliGRAM(s)/deciliter  guaifenesin/dextromethorphan Oral Liquid 10 milliLiter(s) Oral every 4 hours PRN Cough  melatonin 3 milliGRAM(s) Oral at bedtime PRN Insomnia  ondansetron Injectable 4 milliGRAM(s) IV Push every 6 hours PRN Nausea and/or Vomiting        I&O's Summary    12 Dec 2024 07:01  -  13 Dec 2024 07:00  --------------------------------------------------------  IN: 0 mL / OUT: 550 mL / NET: -550 mL        PHYSICAL EXAM:  Vital Signs Last 24 Hrs  T(C): 36.8 (13 Dec 2024 11:13), Max: 36.8 (13 Dec 2024 11:13)  T(F): 98.2 (13 Dec 2024 11:13), Max: 98.2 (13 Dec 2024 11:13)  HR: 104 (13 Dec 2024 11:13) (78 - 104)  BP: 123/83 (13 Dec 2024 11:13) (123/83 - 155/79)  BP(mean): --  RR: 19 (13 Dec 2024 11:13) (17 - 19)  SpO2: 94% (13 Dec 2024 11:13) (94% - 98%)    Parameters below as of 13 Dec 2024 05:24  Patient On (Oxygen Delivery Method): room air        PHYSICAL EXAM-  GENERAL: elderly male, not in acute distress, saturating well on room air at 98%  EYES:  Clear conjunctiva, extraocular movement intact  ENT: Moist mucous membranes  RESP: no WOB, bilateral rhonchi which clears with cough  CV: Regular rate and rhythm, no murmurs appreciated, no lower extremity edema  GI: Soft, non-tender, non-distended  NEURO: Awake, alert, conversant, able to move all extremities, light touch sensation grossly intact  PSYCH: calm and cooperative  SKIN: No rash or lesions, warm and dry    LABS:                        8.8    14.89 )-----------( 343      ( 13 Dec 2024 06:16 )             27.8     12-13    141  |  107  |  53.0[H]  ----------------------------<  153[H]  4.2   |  21.0[L]  |  2.82[H]    Ca    8.8      13 Dec 2024 06:16  Phos  3.4     12-13  Mg     2.0     12-13    TPro  6.1[L]  /  Alb  3.1[L]  /  TBili  <0.2[L]  /  DBili  x   /  AST  16  /  ALT  17  /  AlkPhos  69  12-13          Urinalysis Basic - ( 13 Dec 2024 06:16 )    Color: x / Appearance: x / SG: x / pH: x  Gluc: 153 mg/dL / Ketone: x  / Bili: x / Urobili: x   Blood: x / Protein: x / Nitrite: x   Leuk Esterase: x / RBC: x / WBC x   Sq Epi: x / Non Sq Epi: x / Bacteria: x        Culture - Sputum (collected 11 Dec 2024 21:30)  Source: .Sputum Sputum  Gram Stain (12 Dec 2024 13:00):    No polymorphonuclear leukocytes per low power field    Rare Squamous epithelial cells per low power field    Rare Gram positive cocci in pairs per oil power field  Preliminary Report (13 Dec 2024 07:46):    Commensal jose consistent with body site    Culture - Blood (collected 10 Dec 2024 12:25)  Source: .Blood BLOOD  Preliminary Report (12 Dec 2024 19:01):    No growth at 48 Hours      CAPILLARY BLOOD GLUCOSE      POCT Blood Glucose.: 318 mg/dL (13 Dec 2024 11:09)  POCT Blood Glucose.: 120 mg/dL (13 Dec 2024 07:43)  POCT Blood Glucose.: 261 mg/dL (12 Dec 2024 22:35)  POCT Blood Glucose.: 311 mg/dL (12 Dec 2024 16:52)  POCT Blood Glucose.: 268 mg/dL (12 Dec 2024 11:57)        RADIOLOGY & ADDITIONAL TESTS:  Results Reviewed:   Imaging Personally Reviewed:  Electrocardiogram Personally Reviewed:

## 2024-12-14 LAB
ALBUMIN SERPL ELPH-MCNC: 2.9 G/DL — LOW (ref 3.3–5.2)
ALP SERPL-CCNC: 68 U/L — SIGNIFICANT CHANGE UP (ref 40–120)
ALT FLD-CCNC: 17 U/L — SIGNIFICANT CHANGE UP
ANION GAP SERPL CALC-SCNC: 15 MMOL/L — SIGNIFICANT CHANGE UP (ref 5–17)
AST SERPL-CCNC: 13 U/L — SIGNIFICANT CHANGE UP
BILIRUB SERPL-MCNC: <0.2 MG/DL — LOW (ref 0.4–2)
BUN SERPL-MCNC: 53.4 MG/DL — HIGH (ref 8–20)
CALCIUM SERPL-MCNC: 7.8 MG/DL — LOW (ref 8.4–10.5)
CHLORIDE SERPL-SCNC: 107 MMOL/L — SIGNIFICANT CHANGE UP (ref 96–108)
CO2 SERPL-SCNC: 21 MMOL/L — LOW (ref 22–29)
CREAT SERPL-MCNC: 2.66 MG/DL — HIGH (ref 0.5–1.3)
EGFR: 24 ML/MIN/1.73M2 — LOW
GLUCOSE BLDC GLUCOMTR-MCNC: 133 MG/DL — HIGH (ref 70–99)
GLUCOSE BLDC GLUCOMTR-MCNC: 191 MG/DL — HIGH (ref 70–99)
GLUCOSE BLDC GLUCOMTR-MCNC: 240 MG/DL — HIGH (ref 70–99)
GLUCOSE BLDC GLUCOMTR-MCNC: 242 MG/DL — HIGH (ref 70–99)
GLUCOSE BLDC GLUCOMTR-MCNC: 305 MG/DL — HIGH (ref 70–99)
GLUCOSE BLDC GLUCOMTR-MCNC: 400 MG/DL — HIGH (ref 70–99)
GLUCOSE BLDC GLUCOMTR-MCNC: 430 MG/DL — HIGH (ref 70–99)
GLUCOSE SERPL-MCNC: 187 MG/DL — HIGH (ref 70–99)
HCT VFR BLD CALC: 26 % — LOW (ref 39–50)
HGB BLD-MCNC: 8.4 G/DL — LOW (ref 13–17)
MAGNESIUM SERPL-MCNC: 1.8 MG/DL — SIGNIFICANT CHANGE UP (ref 1.8–2.6)
MCHC RBC-ENTMCNC: 29 PG — SIGNIFICANT CHANGE UP (ref 27–34)
MCHC RBC-ENTMCNC: 32.3 G/DL — SIGNIFICANT CHANGE UP (ref 32–36)
MCV RBC AUTO: 89.7 FL — SIGNIFICANT CHANGE UP (ref 80–100)
PHOSPHATE SERPL-MCNC: 3.7 MG/DL — SIGNIFICANT CHANGE UP (ref 2.4–4.7)
PLATELET # BLD AUTO: 337 K/UL — SIGNIFICANT CHANGE UP (ref 150–400)
POTASSIUM SERPL-MCNC: 3.6 MMOL/L — SIGNIFICANT CHANGE UP (ref 3.5–5.3)
POTASSIUM SERPL-SCNC: 3.6 MMOL/L — SIGNIFICANT CHANGE UP (ref 3.5–5.3)
PROT SERPL-MCNC: 5.4 G/DL — LOW (ref 6.6–8.7)
RBC # BLD: 2.9 M/UL — LOW (ref 4.2–5.8)
RBC # FLD: 16 % — HIGH (ref 10.3–14.5)
SODIUM SERPL-SCNC: 143 MMOL/L — SIGNIFICANT CHANGE UP (ref 135–145)
WBC # BLD: 15.23 K/UL — HIGH (ref 3.8–10.5)
WBC # FLD AUTO: 15.23 K/UL — HIGH (ref 3.8–10.5)

## 2024-12-14 PROCEDURE — 99233 SBSQ HOSP IP/OBS HIGH 50: CPT

## 2024-12-14 RX ORDER — SODIUM CHLORIDE 9 MG/ML
1000 INJECTION, SOLUTION INTRAMUSCULAR; INTRAVENOUS; SUBCUTANEOUS ONCE
Refills: 0 | Status: COMPLETED | OUTPATIENT
Start: 2024-12-14 | End: 2024-12-14

## 2024-12-14 RX ORDER — INSULIN REG, HUM S-S BUFF 100/ML
3 VIAL (ML) INJECTION ONCE
Refills: 0 | Status: DISCONTINUED | OUTPATIENT
Start: 2024-12-14 | End: 2024-12-14

## 2024-12-14 RX ORDER — METOPROLOL TARTRATE 100 MG/1
25 TABLET, FILM COATED ORAL DAILY
Refills: 0 | Status: DISCONTINUED | OUTPATIENT
Start: 2024-12-14 | End: 2024-12-18

## 2024-12-14 RX ORDER — LORAZEPAM 2 MG/1
0.5 TABLET ORAL
Refills: 0 | Status: DISCONTINUED | OUTPATIENT
Start: 2024-12-14 | End: 2024-12-18

## 2024-12-14 RX ORDER — METOPROLOL TARTRATE 100 MG/1
50 TABLET, FILM COATED ORAL DAILY
Refills: 0 | Status: DISCONTINUED | OUTPATIENT
Start: 2024-12-14 | End: 2024-12-14

## 2024-12-14 RX ORDER — INSULIN REG, HUM S-S BUFF 100/ML
3 VIAL (ML) INJECTION ONCE
Refills: 0 | Status: COMPLETED | OUTPATIENT
Start: 2024-12-14 | End: 2024-12-14

## 2024-12-14 RX ORDER — SODIUM CHLORIDE 9 MG/ML
500 INJECTION, SOLUTION INTRAMUSCULAR; INTRAVENOUS; SUBCUTANEOUS ONCE
Refills: 0 | Status: COMPLETED | OUTPATIENT
Start: 2024-12-14 | End: 2024-12-14

## 2024-12-14 RX ADMIN — Medication 2 UNIT(S): at 00:06

## 2024-12-14 RX ADMIN — Medication 5000 UNIT(S): at 17:25

## 2024-12-14 RX ADMIN — ONDANSETRON HYDROCHLORIDE 4 MILLIGRAM(S): 4 TABLET, FILM COATED ORAL at 21:26

## 2024-12-14 RX ADMIN — PIPERACILLIN SODIUM AND TAZOBACTAM SODIUM 25 GRAM(S): 4; .5 INJECTION, POWDER, LYOPHILIZED, FOR SOLUTION INTRAVENOUS at 06:23

## 2024-12-14 RX ADMIN — METOPROLOL TARTRATE 25 MILLIGRAM(S): 100 TABLET, FILM COATED ORAL at 09:37

## 2024-12-14 RX ADMIN — Medication 5 UNIT(S): at 16:54

## 2024-12-14 RX ADMIN — SODIUM CHLORIDE 1000 MILLILITER(S): 9 INJECTION, SOLUTION INTRAMUSCULAR; INTRAVENOUS; SUBCUTANEOUS at 23:21

## 2024-12-14 RX ADMIN — Medication 5 UNIT(S): at 07:34

## 2024-12-14 RX ADMIN — Medication 2: at 07:34

## 2024-12-14 RX ADMIN — Medication 8: at 12:29

## 2024-12-14 RX ADMIN — PANTOPRAZOLE SODIUM 40 MILLIGRAM(S): 40 TABLET, DELAYED RELEASE ORAL at 06:43

## 2024-12-14 RX ADMIN — DEXAMETHASONE 6 MILLIGRAM(S): 1.5 TABLET ORAL at 05:35

## 2024-12-14 RX ADMIN — Medication 4: at 16:54

## 2024-12-14 RX ADMIN — SODIUM BICARBONATE 1300 MILLIGRAM(S): 84 INJECTION, SOLUTION INTRAVENOUS at 13:46

## 2024-12-14 RX ADMIN — Medication 81 MILLIGRAM(S): at 12:29

## 2024-12-14 RX ADMIN — Medication 5000 UNIT(S): at 05:39

## 2024-12-14 RX ADMIN — REMDESIVIR 200 MILLIGRAM(S): 100 INJECTION, POWDER, LYOPHILIZED, FOR SOLUTION INTRAVENOUS at 05:34

## 2024-12-14 RX ADMIN — SODIUM CHLORIDE 1000 MILLILITER(S): 9 INJECTION, SOLUTION INTRAMUSCULAR; INTRAVENOUS; SUBCUTANEOUS at 01:37

## 2024-12-14 RX ADMIN — Medication 5 UNIT(S): at 12:29

## 2024-12-14 RX ADMIN — PIPERACILLIN SODIUM AND TAZOBACTAM SODIUM 25 GRAM(S): 4; .5 INJECTION, POWDER, LYOPHILIZED, FOR SOLUTION INTRAVENOUS at 17:25

## 2024-12-14 RX ADMIN — SODIUM BICARBONATE 1300 MILLIGRAM(S): 84 INJECTION, SOLUTION INTRAVENOUS at 05:34

## 2024-12-14 RX ADMIN — FAMOTIDINE 10 MILLIGRAM(S): 20 TABLET, FILM COATED ORAL at 12:29

## 2024-12-14 RX ADMIN — Medication 3 UNIT(S): at 01:43

## 2024-12-14 RX ADMIN — LORAZEPAM 0.5 MILLIGRAM(S): 2 TABLET ORAL at 13:46

## 2024-12-14 NOTE — CHART NOTE - NSCHARTNOTEFT_GEN_A_CORE
PA NOTE-MEDICINE    Called by RN due to pt c/o constant Non Bloody Diarrhea "All Day since this afternoon".  GI PCR ordered and sent to Lab-Still Processing.  Pt also c/o LLQ Abd Pain that began with onset of Diarrhea.  Pt states 1 Episode of NBNB Vomiting-Zofran prn given.   Pt admitted due Sepsis due to bacterial PNA and COVID, hypotension.  Currently being Tx with Remdesivir, Decadron, Zosyn.     T(C): 38.5 (14 Dec 2024 21:33), Max: 38.5 (14 Dec 2024 21:33)  T(F): 101.3 (14 Dec 2024 21:33), Max: 101.3 (14 Dec 2024 21:33)  HR: 87 (14 Dec 2024 21:33) (70 - 106)  BP: 180/87 (14 Dec 2024 21:33) (120/66 - 180/87)  RR: 18 (14 Dec 2024 21:33) (18 - 20)  SpO2: 98% (14 Dec 2024 21:33) (94% - 99%) ra     General: WDWN Male NAD, Angry about having Diarrhea and abd Pain all day.    Cardiac: S1S2 + RRR  Lungs: CTA No R/R/W or Crackles B/L A-B  Abd: Soft ND + Sl Tenderness on Palp to LLQ No Rigidity or Guarding + Increase in BS x 4 Q   Integument: No Pallor Warm/Dry   Ext: No C/C/E x 4 Ext     A/P   Eval pt c/o ongoing NB Diarrhea x Multiple episodes  GI PCR ordered on Day Shift-Still Processing in Lab  Fever-Ofirmev 1 GM IVPB x 1 Stat   CT ABD/PELVIS Urgent   STAT LABS:   CBC w/Diff  CMP  Mag  Phos  Lactate   Blood Cx x 2   UA Stat   NS 1 L Bolus x 60 min Stat    Continue to Monitor Pt  Recall PA for any changes in Pt Status   Will Follow Labs/CT Study  Will sign out to AM Team to Follow PA NOTE-MEDICINE    Called by RN due to pt c/o constant Non Bloody Diarrhea "All Day since this afternoon".  GI PCR ordered and sent to Lab-Still Processing.  Pt also c/o LLQ Abd Pain that began with onset of Diarrhea.  Pt states 1 Episode of NBNB Vomiting-Zofran prn given.   Pt admitted due Sepsis due to bacterial PNA and COVID, hypotension.  Currently being Tx with Remdesivir, Decadron, Zosyn.     T(C): 38.5 (14 Dec 2024 21:33), Max: 38.5 (14 Dec 2024 21:33)  T(F): 101.3 (14 Dec 2024 21:33), Max: 101.3 (14 Dec 2024 21:33)  HR: 87 (14 Dec 2024 21:33) (70 - 106)  BP: 180/87 (14 Dec 2024 21:33) (120/66 - 180/87)  RR: 18 (14 Dec 2024 21:33) (18 - 20)  SpO2: 98% (14 Dec 2024 21:33) (94% - 99%) ra     General: WDWN Male NAD, Angry about having Diarrhea and abd Pain all day.    Cardiac: S1S2 + RRR  Lungs: CTA No R/R/W or Crackles B/L A-B  Abd: Soft ND + Sl Tenderness on Palp to LLQ No Rigidity or Guarding + Increase in BS x 4 Q   Integument: No Pallor Warm/Dry   Ext: No C/C/E x 4 Ext     A/P   Eval pt c/o ongoing NB Diarrhea x Multiple episodes  GI PCR ordered on Day Shift-Still Processing in Lab  Fever-Ofirmev 1 GM IVPB x 1 Stat   CT ABD/PELVIS Urgent   CXR Urgent   STAT LABS:   CBC w/Diff  CMP  Mag  Phos  Lactate   Blood Cx x 2   UA Stat   NS 1 L Bolus x 60 min Stat    Continue to Monitor Pt  Recall PA for any changes in Pt Status   Will Follow Labs/CT Study  Will sign out to AM Team to Follow PA NOTE-MEDICINE    Called by RN due to pt c/o constant Non Bloody Diarrhea "All Day since this afternoon".  GI PCR ordered and sent to Lab-Still Processing.  Pt also c/o LLQ Abd Pain that began with onset of Diarrhea.  Pt states 1 Episode of NBNB Vomiting-Zofran prn given.   Pt admitted due Sepsis due to bacterial PNA and COVID, hypotension.  Currently being Tx with Remdesivir, Decadron, Zosyn.     T(C): 38.5 (14 Dec 2024 21:33), Max: 38.5 (14 Dec 2024 21:33)  T(F): 101.3 (14 Dec 2024 21:33), Max: 101.3 (14 Dec 2024 21:33)  HR: 87 (14 Dec 2024 21:33) (70 - 106)  BP: 180/87 (14 Dec 2024 21:33) (120/66 - 180/87)  RR: 18 (14 Dec 2024 21:33) (18 - 20)  SpO2: 98% (14 Dec 2024 21:33) (94% - 99%) ra     General: WDWN Male NAD, Angry about having Diarrhea and abd Pain all day.    Cardiac: S1S2 + RRR  Lungs: CTA No R/R/W or Crackles B/L A-B  Abd: Soft ND + Sl Tenderness on Palp to LLQ No Rigidity or Guarding + Increase in BS x 4 Q   Integument: No Pallor Warm/Dry   Ext: No C/C/E x 4 Ext     A/P   Eval pt c/o ongoing NB Diarrhea x Multiple episodes  GI PCR ordered on Day Shift-Still Processing in Lab  Fever-Ofirmev 1 GM IVPB x 1 Stat   CT ABD/PELVIS Urgent   CXR Urgent   STAT LABS:   CBC w/Diff  CMP  Mag  Phos  Lactate   Blood Cx x 2   UA Stat   NS 1 L Bolus x 60 min Stat    Continue to Monitor Pt  Recall PA for any changes in Pt Status   Will Follow Labs/CT Study  Will sign out to AM Team to Follow    ADDENDUM:     LABS:                        10.2   24.95 )-----------( 408      ( 14 Dec 2024 22:35 )             33.6     12-14    149[H]  |  110[H]  |  46.9[H]  ----------------------------<  98  3.9   |  16.0[L]  |  2.72[H]    Ca    8.8      14 Dec 2024 22:35  Phos  4.0     12-14  Mg     1.8     12-14    Lactate: 4.10  Procalcitonin: 0.20   ABG - ( 15 Dec 2024 01:47 )  pH, Arterial: 7.480 pH, Blood: x     /  pCO2: 27    /  pO2: 78    / HCO3: 20    / Base Excess: -3.4  /  SaO2: 98.9 PA NOTE-MEDICINE    Called by RN due to pt c/o constant Non Bloody Diarrhea "All Day since this afternoon".  GI PCR ordered and sent to Lab-Still Processing.  Pt also c/o LLQ Abd Pain that began with onset of Diarrhea.  Pt states 1 Episode of NBNB Vomiting-Zofran prn given.   Pt admitted due Sepsis due to bacterial PNA and COVID, hypotension.  Currently being Tx with Remdesivir, Decadron, Zosyn.     T(C): 38.5 (14 Dec 2024 21:33), Max: 38.5 (14 Dec 2024 21:33)  T(F): 101.3 (14 Dec 2024 21:33), Max: 101.3 (14 Dec 2024 21:33)  HR: 87 (14 Dec 2024 21:33) (70 - 106)  BP: 180/87 (14 Dec 2024 21:33) (120/66 - 180/87)  RR: 18 (14 Dec 2024 21:33) (18 - 20)  SpO2: 98% (14 Dec 2024 21:33) (94% - 99%) ra     General: WDWN Male NAD, Angry about having Diarrhea and abd Pain all day.    Cardiac: S1S2 + RRR  Lungs: CTA No R/R/W or Crackles B/L A-B  Abd: Soft ND + Sl Tenderness on Palp to LLQ No Rigidity or Guarding + Increase in BS x 4 Q   Integument: No Pallor Warm/Dry   Ext: No C/C/E x 4 Ext     A/P   Eval pt c/o ongoing NB Diarrhea x Multiple episodes  GI PCR ordered on Day Shift-Still Processing in Lab  Fever-Ofirmev 1 GM IVPB x 1 Stat   CT ABD/PELVIS Urgent   CXR Urgent   STAT LABS:   CBC w/Diff  CMP  Mag  Phos  Lactate   Blood Cx x 2   UA Stat   NS 1 L Bolus x 60 min Stat    Continue to Monitor Pt  Recall PA for any changes in Pt Status   Will Follow Labs/CT Study  Will sign out to AM Team to Follow    ADDENDUM:     LABS:    Lactate: 4.10                         10.2   24.95 )-----------( 408      ( 14 Dec 2024 22:35 )             33.6     12-14    149[H]  |  110[H]  |  46.9[H]  ----------------------------<  98  3.9   |  16.0[L]  |  2.72[H]    Ca    8.8      14 Dec 2024 22:35  Phos  4.0     12-14  Mg     1.8     12-14    Lactate: 4.10  Ordered Wt Based Fluids LR @ 2300 cc's x 2 Hrs  Repeat Lactate ordered for 4 AM   Procalcitonin: 0.20   ABG - ( 15 Dec 2024 01:47 )  pH, Arterial: 7.480 pH, Blood: x     /  pCO2: 27    /  pO2: 78    / HCO3: 20    / Base Excess: -3.4  /  SaO2: 98.9  Suspected C-Diff: Ordered Vanco Soln 250 mg po Q 6 x 10 Days  ID Consult Called for AM PA NOTE-MEDICINE    Called by RN due to pt c/o constant Non Bloody Diarrhea "All Day since this afternoon".  GI PCR ordered and sent to Lab-Still Processing.  Pt also c/o LLQ Abd Pain that began with onset of Diarrhea.  Pt states 1 Episode of NBNB Vomiting-Zofran prn given.   Pt admitted due Sepsis due to bacterial PNA and COVID, hypotension.  Currently being Tx with Remdesivir, Decadron, Zosyn.     T(C): 38.5 (14 Dec 2024 21:33), Max: 38.5 (14 Dec 2024 21:33)  T(F): 101.3 (14 Dec 2024 21:33), Max: 101.3 (14 Dec 2024 21:33)  HR: 87 (14 Dec 2024 21:33) (70 - 106)  BP: 180/87 (14 Dec 2024 21:33) (120/66 - 180/87)  RR: 18 (14 Dec 2024 21:33) (18 - 20)  SpO2: 98% (14 Dec 2024 21:33) (94% - 99%) ra     General: WDWN Male NAD, Angry about having Diarrhea and abd Pain all day.    Cardiac: S1S2 + RRR  Lungs: CTA No R/R/W or Crackles B/L A-B  Abd: Soft ND + Sl Tenderness on Palp to LLQ No Rigidity or Guarding + Increase in BS x 4 Q   Integument: No Pallor Warm/Dry   Ext: No C/C/E x 4 Ext     A/P   Eval pt c/o ongoing NB Diarrhea x Multiple episodes  GI PCR ordered on Day Shift-Still Processing in Lab  Fever-Ofirmev 1 GM IVPB x 1 Stat   CT ABD/PELVIS Urgent   CXR Urgent   STAT LABS:   CBC w/Diff  CMP  Mag  Phos  Lactate   Blood Cx x 2   UA Stat   NS 1 L Bolus x 60 min Stat    Continue to Monitor Pt  Recall PA for any changes in Pt Status   Will Follow Labs/CT Study  Will sign out to AM Team to Follow    ADDENDUM:     LABS:    Lactate: 4.10                         10.2   24.95 )-----------( 408      ( 14 Dec 2024 22:35 )             33.6     12-14    149[H]  |  110[H]  |  46.9[H]  ----------------------------<  98  3.9   |  16.0[L]  |  2.72[H]    Ca    8.8      14 Dec 2024 22:35  Phos  4.0     12-14  Mg     1.8     12-14    Lactate: 4.10  Ordered Wt Based Fluids LR @ 2300 cc's x 2 Hrs  Repeat Lactate ordered for 4 AM   Procalcitonin: 0.20   ABG - ( 15 Dec 2024 01:47 )  pH, Arterial: 7.480 pH, Blood: x     /  pCO2: 27    /  pO2: 78    / HCO3: 20    / Base Excess: -3.4  /  SaO2: 98.9  Suspected C-Diff: Ordered Vanco Soln 125 mg po Q 6 x 10 Days  ID Consult Called for AM PA NOTE-MEDICINE    Called by RN due to pt c/o constant Non Bloody Diarrhea "All Day since this afternoon".  GI PCR ordered and sent to Lab-Still Processing.  Pt also c/o LLQ Abd Pain that began with onset of Diarrhea.  Pt states 1 Episode of NBNB Vomiting-Zofran prn given.   Pt admitted due Sepsis due to bacterial PNA and COVID, hypotension.  Currently being Tx with Remdesivir, Decadron, Zosyn.     T(C): 38.5 (14 Dec 2024 21:33), Max: 38.5 (14 Dec 2024 21:33)  T(F): 101.3 (14 Dec 2024 21:33), Max: 101.3 (14 Dec 2024 21:33)  HR: 87 (14 Dec 2024 21:33) (70 - 106)  BP: 180/87 (14 Dec 2024 21:33) (120/66 - 180/87)  RR: 18 (14 Dec 2024 21:33) (18 - 20)  SpO2: 98% (14 Dec 2024 21:33) (94% - 99%) ra     General: WDWN Male NAD, Angry about having Diarrhea and abd Pain all day.    Cardiac: S1S2 + RRR  Lungs: CTA No R/R/W or Crackles B/L A-B  Abd: Soft ND + Sl Tenderness on Palp to LLQ No Rigidity or Guarding + Increase in BS x 4 Q   Integument: No Pallor Warm/Dry   Ext: No C/C/E x 4 Ext     A/P   Eval pt c/o ongoing NB Diarrhea x Multiple episodes  GI PCR ordered on Day Shift-Still Processing in Lab  Fever-Ofirmev 1 GM IVPB x 1 Stat   CT ABD/PELVIS Urgent   CXR Urgent   STAT LABS:   CBC w/Diff  CMP  Mag  Phos  Lactate   Blood Cx x 2   UA Stat   NS 1 L Bolus x 60 min Stat  GI PCR ordered during day Shift: + Norovirus   Pt already on Airborne/Contact for Covid     Continue to Monitor Pt  Recall PA for any changes in Pt Status   Will Follow Labs/CT Study  Will sign out to AM Team to Follow    ADDENDUM:     LABS:    Lactate: 4.10                         10.2   24.95 )-----------( 408      ( 14 Dec 2024 22:35 )             33.6     12-14    149[H]  |  110[H]  |  46.9[H]  ----------------------------<  98  3.9   |  16.0[L]  |  2.72[H]    Ca    8.8      14 Dec 2024 22:35  Phos  4.0     12-14  Mg     1.8     12-14    Lactate: 4.10  Ordered Wt Based Fluids LR @ 2300 cc's x 2 Hrs  Repeat Lactate ordered for 4 AM   Procalcitonin: 0.20   ABG - ( 15 Dec 2024 01:47 )  pH, Arterial: 7.480 pH, Blood: x     /  pCO2: 27    /  pO2: 78    / HCO3: 20    / Base Excess: -3.4  /  SaO2: 98.9  Suspected C-Diff: Ordered Vanco Soln 125 mg po Q 6 x 10 Days  ID Consult Called for AM PA NOTE-MEDICINE    Called by RN due to pt c/o constant Non Bloody Diarrhea "All Day since this afternoon".  GI PCR ordered and sent to Lab-Still Processing.  Pt also c/o LLQ Abd Pain that began with onset of Diarrhea.  Pt states 1 Episode of NBNB Vomiting-Zofran prn given.   Pt admitted due Sepsis due to bacterial PNA and COVID, hypotension.  Currently being Tx with Remdesivir, Decadron, Zosyn.     T(C): 38.5 (14 Dec 2024 21:33), Max: 38.5 (14 Dec 2024 21:33)  T(F): 101.3 (14 Dec 2024 21:33), Max: 101.3 (14 Dec 2024 21:33)  HR: 87 (14 Dec 2024 21:33) (70 - 106)  BP: 180/87 (14 Dec 2024 21:33) (120/66 - 180/87)  RR: 18 (14 Dec 2024 21:33) (18 - 20)  SpO2: 98% (14 Dec 2024 21:33) (94% - 99%) ra     General: WDWN Male NAD, Angry about having Diarrhea and abd Pain all day.    Cardiac: S1S2 + RRR  Lungs: CTA No R/R/W or Crackles B/L A-B  Abd: Soft ND + Sl Tenderness on Palp to LLQ No Rigidity or Guarding + Increase in BS x 4 Q   Integument: No Pallor Warm/Dry   Ext: No C/C/E x 4 Ext     A/P   Eval pt c/o ongoing NB Diarrhea x Multiple episodes  GI PCR ordered on Day Shift-Still Processing in Lab  Fever-Ofirmev 1 GM IVPB x 1 Stat   CT ABD/PELVIS Urgent   CXR Urgent   STAT LABS:   CBC w/Diff  CMP  Mag  Phos  Lactate   Blood Cx x 2   UA Stat   NS 1 L Bolus x 60 min Stat  GI PCR ordered during day Shift: Pending     Continue to Monitor Pt  Recall PA for any changes in Pt Status   Will Follow Labs/CT Study  Will sign out to AM Team to Follow    ADDENDUM:     LABS:    Lactate: 4.10                         10.2   24.95 )-----------( 408      ( 14 Dec 2024 22:35 )             33.6     12-14    149[H]  |  110[H]  |  46.9[H]  ----------------------------<  98  3.9   |  16.0[L]  |  2.72[H]    Ca    8.8      14 Dec 2024 22:35  Phos  4.0     12-14  Mg     1.8     12-14    Lactate: 4.10  Ordered Wt Based Fluids LR @ 2300 cc's x 2 Hrs  Repeat Lactate ordered for 4 AM   Procalcitonin: 0.20   ABG - ( 15 Dec 2024 01:47 )  pH, Arterial: 7.480 pH, Blood: x     /  pCO2: 27    /  pO2: 78    / HCO3: 20    / Base Excess: -3.4  /  SaO2: 98.9  Suspected C-Diff: Ordered Vanco Soln 125 mg po Q 6 x 10 Days  ID Consult Called for AM  ADDENDUM:  GI PCR Resulted: + Norovirus     Continue to monitor Pt  Repeat Full Set of Vitals and record in Flowsheet    Recall PA for any changes in Pt status   Will sign out to AM Team

## 2024-12-14 NOTE — PROGRESS NOTE ADULT - ASSESSMENT
74 y.o. male with hx of coronary artery disease s/p drug-eluting stent therapy of the proximal LAD in November 2022 , carotid artery disease (80% left carotid stenosis), peripheral arterial disease right SFA stent therapy at that time and then subsequently underwent left SFA stent therapy in 2023, hypertension, hyperlipidemia and lung cancer treated with chemotherapy and radiation and apparently has no evidence of disease at this time. He presented to [facility] in February 2024 with a left occipital stroke. Had recent dx of septic pneumonia 1 months ago & hospital stay at [place]. Patient was diagnosed with Covid today at PCP. Patient had sudden, crushing, central non radiating chest pain, PCP gave 2 sprays of nitro. Patient then had seizure-like acitivity per family, likely vasovagaled, BP was 70s/40s, hypoxic. Patient had no post-ictal state, patient is A&Ox3. Admitted with COVID infection and septic shock. Started on IV covid rx and IV abx.     #Diarrhea  Stool count  GI PCR  Leukocytosis slightly worsening    # Initially in triage had shock- 79/46mmHg, 132 bpm, 24/mt = septic shock  # Due to COVID infection  # Due to possible superimposed bacterial pneumonia  # vs shock sec to vasovagal episode  Continue Remdesivir  Continue Decadron  Saturating well on room air  Continue Zosyn for superimposed bacterial pneumonia    # Chest pain- resolved  seen by cardiology  atypical CP  EKG NSR. 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

## 2024-12-14 NOTE — PROGRESS NOTE ADULT - SUBJECTIVE AND OBJECTIVE BOX
Chief complaint: COVID    Patient seen and examined at bedside. No acute overnight events reported. No fever, chills, nausea or vomiting. Complaining of diarrhea, watery stol  Chief complaint: COVID    Patient seen and examined at bedside. No acute overnight events reported. No fever, chills, nausea or vomiting.  Patient states he has had multiple episodes of watery diarrhea overnight.    Vital Signs Last 24 Hrs  T(F): 97.7 (14 Dec 2024 04:40), Max: 98.2 (13 Dec 2024 11:13)  HR: 100 (14 Dec 2024 04:40) (100 - 104)  BP: 144/82 (14 Dec 2024 04:40) (123/80 - 168/79)  RR: 19 (13 Dec 2024 20:00) (18 - 19)  SpO2: 98% (14 Dec 2024 04:40) (94% - 99%)    Physical Exam:  Constitutional: alert and oriented, in no acute distress   Neck: Soft and supple  Respiratory: Clear to auscultation bilaterally  Cardiovascular: Regular rate and rhyhtm  Gastrointestinal: Soft, non-tender to palpation, +bs  Vascular: 2+ peripheral pulses  Neurological: A/O x 3  Musculoskeletal: 5/5 strength b/l upper and lower extremities, no lower extremity edema bilaterally    Labs:                        8.4    15.23 )-----------( 337      ( 14 Dec 2024 05:40 )             26.0   12-14    143  |  107  |  53.4[H]  ----------------------------<  187[H]  3.6   |  21.0[L]  |  2.66[H]    Ca    7.8[L]      14 Dec 2024 05:40  Phos  3.7     12-14  Mg     1.8     12-14    TPro  5.4[L]  /  Alb  2.9[L]  /  TBili  <0.2[L]  /  DBili  x   /  AST  13  /  ALT  17  /  AlkPhos  68  12-14

## 2024-12-15 LAB
ALBUMIN SERPL ELPH-MCNC: 3.3 G/DL — SIGNIFICANT CHANGE UP (ref 3.3–5.2)
ALP SERPL-CCNC: 80 U/L — SIGNIFICANT CHANGE UP (ref 40–120)
ALT FLD-CCNC: 26 U/L — SIGNIFICANT CHANGE UP
ANION GAP SERPL CALC-SCNC: 11 MMOL/L — SIGNIFICANT CHANGE UP (ref 5–17)
ANION GAP SERPL CALC-SCNC: 23 MMOL/L — HIGH (ref 5–17)
ANISOCYTOSIS BLD QL: SLIGHT — SIGNIFICANT CHANGE UP
AST SERPL-CCNC: 27 U/L — SIGNIFICANT CHANGE UP
BASOPHILS # BLD AUTO: 0.76 K/UL — HIGH (ref 0–0.2)
BASOPHILS NFR BLD AUTO: 4.4 % — HIGH (ref 0–2)
BILIRUB SERPL-MCNC: 0.2 MG/DL — LOW (ref 0.4–2)
BUN SERPL-MCNC: 41 MG/DL — HIGH (ref 8–20)
BUN SERPL-MCNC: 46.9 MG/DL — HIGH (ref 8–20)
CALCIUM SERPL-MCNC: 7.7 MG/DL — LOW (ref 8.4–10.5)
CALCIUM SERPL-MCNC: 8.8 MG/DL — SIGNIFICANT CHANGE UP (ref 8.4–10.5)
CHLORIDE SERPL-SCNC: 110 MMOL/L — HIGH (ref 96–108)
CHLORIDE SERPL-SCNC: 110 MMOL/L — HIGH (ref 96–108)
CO2 SERPL-SCNC: 16 MMOL/L — LOW (ref 22–29)
CO2 SERPL-SCNC: 20 MMOL/L — LOW (ref 22–29)
CREAT SERPL-MCNC: 2.41 MG/DL — HIGH (ref 0.5–1.3)
CREAT SERPL-MCNC: 2.72 MG/DL — HIGH (ref 0.5–1.3)
CULTURE RESULTS: SIGNIFICANT CHANGE UP
EGFR: 24 ML/MIN/1.73M2 — LOW
EGFR: 27 ML/MIN/1.73M2 — LOW
ELLIPTOCYTES BLD QL SMEAR: SLIGHT — SIGNIFICANT CHANGE UP
EOSINOPHIL # BLD AUTO: 0 K/UL — SIGNIFICANT CHANGE UP (ref 0–0.5)
EOSINOPHIL NFR BLD AUTO: 0 % — SIGNIFICANT CHANGE UP (ref 0–6)
GAS PNL BLDA: SIGNIFICANT CHANGE UP
GI PCR PANEL: DETECTED
GLUCOSE BLDC GLUCOMTR-MCNC: 147 MG/DL — HIGH (ref 70–99)
GLUCOSE BLDC GLUCOMTR-MCNC: 155 MG/DL — HIGH (ref 70–99)
GLUCOSE BLDC GLUCOMTR-MCNC: 186 MG/DL — HIGH (ref 70–99)
GLUCOSE BLDC GLUCOMTR-MCNC: 210 MG/DL — HIGH (ref 70–99)
GLUCOSE BLDC GLUCOMTR-MCNC: 65 MG/DL — LOW (ref 70–99)
GLUCOSE BLDC GLUCOMTR-MCNC: 69 MG/DL — LOW (ref 70–99)
GLUCOSE BLDC GLUCOMTR-MCNC: 75 MG/DL — SIGNIFICANT CHANGE UP (ref 70–99)
GLUCOSE BLDC GLUCOMTR-MCNC: 84 MG/DL — SIGNIFICANT CHANGE UP (ref 70–99)
GLUCOSE BLDC GLUCOMTR-MCNC: 85 MG/DL — SIGNIFICANT CHANGE UP (ref 70–99)
GLUCOSE SERPL-MCNC: 56 MG/DL — LOW (ref 70–99)
GLUCOSE SERPL-MCNC: 98 MG/DL — SIGNIFICANT CHANGE UP (ref 70–99)
HCT VFR BLD CALC: 24.7 % — LOW (ref 39–50)
HCT VFR BLD CALC: 33.6 % — LOW (ref 39–50)
HGB BLD-MCNC: 10.2 G/DL — LOW (ref 13–17)
HGB BLD-MCNC: 7.8 G/DL — LOW (ref 13–17)
HYPOCHROMIA BLD QL: SLIGHT — SIGNIFICANT CHANGE UP
LACTATE BLDV-MCNC: 1.5 MMOL/L — SIGNIFICANT CHANGE UP (ref 0.5–2)
LACTATE BLDV-MCNC: 4.1 MMOL/L — CRITICAL HIGH (ref 0.5–2)
LACTATE SERPL-SCNC: 1.6 MMOL/L — SIGNIFICANT CHANGE UP (ref 0.5–2)
LYMPHOCYTES # BLD AUTO: 0.92 K/UL — LOW (ref 1–3.3)
LYMPHOCYTES # BLD AUTO: 5.3 % — LOW (ref 13–44)
MACROCYTES BLD QL: SLIGHT — SIGNIFICANT CHANGE UP
MAGNESIUM SERPL-MCNC: 1.8 MG/DL — SIGNIFICANT CHANGE UP (ref 1.6–2.6)
MANUAL SMEAR VERIFICATION: SIGNIFICANT CHANGE UP
MCHC RBC-ENTMCNC: 28.9 PG — SIGNIFICANT CHANGE UP (ref 27–34)
MCHC RBC-ENTMCNC: 29.2 PG — SIGNIFICANT CHANGE UP (ref 27–34)
MCHC RBC-ENTMCNC: 30.4 G/DL — LOW (ref 32–36)
MCHC RBC-ENTMCNC: 31.6 G/DL — LOW (ref 32–36)
MCV RBC AUTO: 92.5 FL — SIGNIFICANT CHANGE UP (ref 80–100)
MCV RBC AUTO: 95.2 FL — SIGNIFICANT CHANGE UP (ref 80–100)
METAMYELOCYTES # FLD: 2.7 % — HIGH (ref 0–0)
MICROCYTES BLD QL: SLIGHT — SIGNIFICANT CHANGE UP
MONOCYTES # BLD AUTO: 0.76 K/UL — SIGNIFICANT CHANGE UP (ref 0–0.9)
MONOCYTES NFR BLD AUTO: 4.4 % — SIGNIFICANT CHANGE UP (ref 2–14)
MYELOCYTES NFR BLD: 12.4 % — HIGH (ref 0–0)
NEUTROPHILS # BLD AUTO: 11.52 K/UL — HIGH (ref 1.8–7.4)
NEUTROPHILS NFR BLD AUTO: 62.8 % — SIGNIFICANT CHANGE UP (ref 43–77)
NEUTS BAND # BLD: 3.6 % — SIGNIFICANT CHANGE UP (ref 0–8)
NOROVIRUS GI+II RNA STL QL NAA+NON-PROBE: DETECTED
NRBC # BLD: 5 /100 WBCS — HIGH (ref 0–0)
OVALOCYTES BLD QL SMEAR: SLIGHT — SIGNIFICANT CHANGE UP
PHOSPHATE SERPL-MCNC: 4 MG/DL — SIGNIFICANT CHANGE UP (ref 2.4–4.7)
PLAT MORPH BLD: NORMAL — SIGNIFICANT CHANGE UP
PLATELET # BLD AUTO: 271 K/UL — SIGNIFICANT CHANGE UP (ref 150–400)
PLATELET # BLD AUTO: 408 K/UL — HIGH (ref 150–400)
POIKILOCYTOSIS BLD QL AUTO: SLIGHT — SIGNIFICANT CHANGE UP
POLYCHROMASIA BLD QL SMEAR: SLIGHT — SIGNIFICANT CHANGE UP
POTASSIUM SERPL-MCNC: 3.9 MMOL/L — SIGNIFICANT CHANGE UP (ref 3.5–5.3)
POTASSIUM SERPL-MCNC: 3.9 MMOL/L — SIGNIFICANT CHANGE UP (ref 3.5–5.3)
POTASSIUM SERPL-SCNC: 3.9 MMOL/L — SIGNIFICANT CHANGE UP (ref 3.5–5.3)
POTASSIUM SERPL-SCNC: 3.9 MMOL/L — SIGNIFICANT CHANGE UP (ref 3.5–5.3)
PROCALCITONIN SERPL-MCNC: 0.2 NG/ML — HIGH (ref 0.02–0.1)
PROMYELOCYTES # FLD: 4.4 % — HIGH (ref 0–0)
PROT SERPL-MCNC: 6.6 G/DL — SIGNIFICANT CHANGE UP (ref 6.6–8.7)
RBC # BLD: 2.67 M/UL — LOW (ref 4.2–5.8)
RBC # BLD: 3.53 M/UL — LOW (ref 4.2–5.8)
RBC # FLD: 16.5 % — HIGH (ref 10.3–14.5)
RBC # FLD: 16.8 % — HIGH (ref 10.3–14.5)
RBC BLD AUTO: ABNORMAL
SMUDGE CELLS # BLD: PRESENT — SIGNIFICANT CHANGE UP
SODIUM SERPL-SCNC: 141 MMOL/L — SIGNIFICANT CHANGE UP (ref 135–145)
SODIUM SERPL-SCNC: 149 MMOL/L — HIGH (ref 135–145)
SPECIMEN SOURCE: SIGNIFICANT CHANGE UP
TARGETS BLD QL SMEAR: SLIGHT — SIGNIFICANT CHANGE UP
WBC # BLD: 17.35 K/UL — HIGH (ref 3.8–10.5)
WBC # BLD: 24.95 K/UL — HIGH (ref 3.8–10.5)
WBC # FLD AUTO: 17.35 K/UL — HIGH (ref 3.8–10.5)
WBC # FLD AUTO: 24.95 K/UL — HIGH (ref 3.8–10.5)

## 2024-12-15 PROCEDURE — 99233 SBSQ HOSP IP/OBS HIGH 50: CPT

## 2024-12-15 PROCEDURE — 74176 CT ABD & PELVIS W/O CONTRAST: CPT | Mod: 26

## 2024-12-15 PROCEDURE — 71045 X-RAY EXAM CHEST 1 VIEW: CPT | Mod: 26

## 2024-12-15 RX ORDER — VANCOMYCIN HCL 900 MCG/MG
250 POWDER (GRAM) MISCELLANEOUS EVERY 6 HOURS
Refills: 0 | Status: DISCONTINUED | OUTPATIENT
Start: 2024-12-15 | End: 2024-12-15

## 2024-12-15 RX ORDER — SODIUM CHLORIDE 9 MG/ML
1000 INJECTION, SOLUTION INTRAMUSCULAR; INTRAVENOUS; SUBCUTANEOUS
Refills: 0 | Status: DISCONTINUED | OUTPATIENT
Start: 2024-12-15 | End: 2024-12-18

## 2024-12-15 RX ORDER — POTASSIUM CHLORIDE 600 MG/1
10 TABLET, EXTENDED RELEASE ORAL
Refills: 0 | Status: COMPLETED | OUTPATIENT
Start: 2024-12-15 | End: 2024-12-15

## 2024-12-15 RX ORDER — 0.9 % SODIUM CHLORIDE 0.9 %
2300 INTRAVENOUS SOLUTION INTRAVENOUS ONCE
Refills: 0 | Status: COMPLETED | OUTPATIENT
Start: 2024-12-15 | End: 2024-12-15

## 2024-12-15 RX ORDER — ACETAMINOPHEN 500MG 500 MG/1
1000 TABLET, COATED ORAL ONCE
Refills: 0 | Status: COMPLETED | OUTPATIENT
Start: 2024-12-15 | End: 2024-12-15

## 2024-12-15 RX ORDER — VANCOMYCIN HCL 900 MCG/MG
125 POWDER (GRAM) MISCELLANEOUS EVERY 6 HOURS
Refills: 0 | Status: DISCONTINUED | OUTPATIENT
Start: 2024-12-15 | End: 2024-12-15

## 2024-12-15 RX ADMIN — LORAZEPAM 0.5 MILLIGRAM(S): 2 TABLET ORAL at 15:46

## 2024-12-15 RX ADMIN — Medication 25 GRAM(S): at 08:42

## 2024-12-15 RX ADMIN — POTASSIUM CHLORIDE 100 MILLIEQUIVALENT(S): 600 TABLET, EXTENDED RELEASE ORAL at 02:57

## 2024-12-15 RX ADMIN — Medication 2 MILLIGRAM(S): at 10:23

## 2024-12-15 RX ADMIN — Medication 1150 MILLILITER(S): at 01:53

## 2024-12-15 RX ADMIN — PIPERACILLIN SODIUM AND TAZOBACTAM SODIUM 25 GRAM(S): 4; .5 INJECTION, POWDER, LYOPHILIZED, FOR SOLUTION INTRAVENOUS at 18:37

## 2024-12-15 RX ADMIN — ACETAMINOPHEN 500MG 400 MILLIGRAM(S): 500 TABLET, COATED ORAL at 00:52

## 2024-12-15 RX ADMIN — Medication 2 MILLIGRAM(S): at 19:04

## 2024-12-15 RX ADMIN — POTASSIUM CHLORIDE 100 MILLIEQUIVALENT(S): 600 TABLET, EXTENDED RELEASE ORAL at 02:30

## 2024-12-15 RX ADMIN — Medication 81 MILLIGRAM(S): at 12:56

## 2024-12-15 RX ADMIN — METOPROLOL TARTRATE 25 MILLIGRAM(S): 100 TABLET, FILM COATED ORAL at 06:12

## 2024-12-15 RX ADMIN — Medication 2: at 12:56

## 2024-12-15 RX ADMIN — ONDANSETRON HYDROCHLORIDE 4 MILLIGRAM(S): 4 TABLET, FILM COATED ORAL at 15:25

## 2024-12-15 RX ADMIN — SODIUM CHLORIDE 60 MILLILITER(S): 9 INJECTION, SOLUTION INTRAMUSCULAR; INTRAVENOUS; SUBCUTANEOUS at 13:47

## 2024-12-15 RX ADMIN — Medication 5000 UNIT(S): at 06:11

## 2024-12-15 RX ADMIN — Medication 5 UNIT(S): at 16:54

## 2024-12-15 RX ADMIN — Medication 5000 UNIT(S): at 18:36

## 2024-12-15 RX ADMIN — DEXAMETHASONE 6 MILLIGRAM(S): 1.5 TABLET ORAL at 06:11

## 2024-12-15 RX ADMIN — SODIUM BICARBONATE 1300 MILLIGRAM(S): 84 INJECTION, SOLUTION INTRAVENOUS at 21:34

## 2024-12-15 RX ADMIN — SODIUM BICARBONATE 1300 MILLIGRAM(S): 84 INJECTION, SOLUTION INTRAVENOUS at 01:53

## 2024-12-15 RX ADMIN — Medication 2: at 16:55

## 2024-12-15 RX ADMIN — SODIUM BICARBONATE 1300 MILLIGRAM(S): 84 INJECTION, SOLUTION INTRAVENOUS at 06:12

## 2024-12-15 RX ADMIN — PIPERACILLIN SODIUM AND TAZOBACTAM SODIUM 25 GRAM(S): 4; .5 INJECTION, POWDER, LYOPHILIZED, FOR SOLUTION INTRAVENOUS at 06:35

## 2024-12-15 RX ADMIN — ACETAMINOPHEN 500MG 1000 MILLIGRAM(S): 500 TABLET, COATED ORAL at 00:52

## 2024-12-15 RX ADMIN — POTASSIUM CHLORIDE 100 MILLIEQUIVALENT(S): 600 TABLET, EXTENDED RELEASE ORAL at 04:47

## 2024-12-15 RX ADMIN — REMDESIVIR 200 MILLIGRAM(S): 100 INJECTION, POWDER, LYOPHILIZED, FOR SOLUTION INTRAVENOUS at 06:27

## 2024-12-15 RX ADMIN — LORAZEPAM 0.5 MILLIGRAM(S): 2 TABLET ORAL at 01:54

## 2024-12-15 RX ADMIN — SODIUM BICARBONATE 1300 MILLIGRAM(S): 84 INJECTION, SOLUTION INTRAVENOUS at 13:47

## 2024-12-15 RX ADMIN — Medication 125 MILLIGRAM(S): at 06:27

## 2024-12-15 RX ADMIN — PANTOPRAZOLE SODIUM 40 MILLIGRAM(S): 40 TABLET, DELAYED RELEASE ORAL at 06:11

## 2024-12-15 RX ADMIN — Medication 15 GRAM(S): at 08:19

## 2024-12-15 RX ADMIN — Medication 5 UNIT(S): at 12:56

## 2024-12-15 RX ADMIN — ROSUVASTATIN CALCIUM 20 MILLIGRAM(S): 5 TABLET, FILM COATED ORAL at 21:34

## 2024-12-15 RX ADMIN — FAMOTIDINE 10 MILLIGRAM(S): 20 TABLET, FILM COATED ORAL at 12:56

## 2024-12-15 NOTE — PROGRESS NOTE ADULT - SUBJECTIVE AND OBJECTIVE BOX
Chief complaint: Norovirus     Patient seen and examined at bedside. Overnight patient had elevated lactate and significant diarrhea. Stool collected and patient started on PO Vancomycin. No fever, chills, nausea or vomiting.     Vital Signs Last 24 Hrs  T(F): 98 (15 Dec 2024 08:51), Max: 102 (14 Dec 2024 22:34)  HR: 108 (15 Dec 2024 08:51) (70 - 108)  BP: 155/78 (15 Dec 2024 08:51) (108/48 - 180/87)  RR: 18 (15 Dec 2024 08:51) (18 - 20)  SpO2: 98% (15 Dec 2024 08:51) (94% - 98%)    Physical Exam:  Constitutional: alert and oriented, in no acute distress   Neck: Soft and supple  Respiratory: Clear to auscultation bilaterally  Cardiovascular: Regular rate and rhyhtm  Gastrointestinal: Soft, non-tender to palpation, +bs  Vascular: 2+ peripheral pulses  Neurological: A/O x 3  Musculoskeletal: 5/5 strength b/l upper and lower extremities, no lower extremity edema bilaterally    Labs:                        7.8    17.35 )-----------( 271      ( 15 Dec 2024 05:50 )             24.7   12-15    141  |  110[H]  |  41.0[H]  ----------------------------<  56[L]  3.9   |  20.0[L]  |  2.41[H]    Ca    7.7[L]      15 Dec 2024 05:50  Phos  4.0     12-14  Mg     1.8     12-14    TPro  6.6  /  Alb  3.3  /  TBili  0.2[L]  /  DBili  x   /  AST  27  /  ALT  26  /  AlkPhos  80  12-14

## 2024-12-15 NOTE — PROVIDER CONTACT NOTE (HYPOGLYCEMIA EVENT) - NS PROVIDER CONTACT BACKGROUND-HYPO
Age: 74y    Gender: Male    POCT Blood Glucose:  210 mg/dL (12-15-24 @ 09:17)  85 mg/dL (12-15-24 @ 08:36)  65 mg/dL (12-15-24 @ 08:06)  69 mg/dL (12-15-24 @ 08:05)  133 mg/dL (12-14-24 @ 21:16)  242 mg/dL (12-14-24 @ 16:52)  305 mg/dL (12-14-24 @ 12:27)      eMAR:dexAMETHasone  Injectable   6 milliGRAM(s) IV Push (12-15-24 @ 06:11)    dextrose 50% Injectable   25 Gram(s) IV Push (12-15-24 @ 08:42)    dextrose Oral Gel   15 Gram(s) Oral (12-15-24 @ 08:19)    insulin lispro (ADMELOG) corrective regimen sliding scale   4 Unit(s) SubCutaneous (12-14-24 @ 16:54)   8 Unit(s) SubCutaneous (12-14-24 @ 12:29)    insulin lispro Injectable (ADMELOG)   5 Unit(s) SubCutaneous (12-14-24 @ 16:54)   5 Unit(s) SubCutaneous (12-14-24 @ 12:29)

## 2024-12-15 NOTE — PROGRESS NOTE ADULT - ASSESSMENT
74 y.o. male with hx of coronary artery disease s/p drug-eluting stent therapy of the proximal LAD in November 2022 , carotid artery disease (80% left carotid stenosis), peripheral arterial disease right SFA stent therapy at that time and then subsequently underwent left SFA stent therapy in 2023, hypertension, hyperlipidemia and lung cancer treated with chemotherapy and radiation and apparently has no evidence of disease at this time. He presented to [facility] in February 2024 with a left occipital stroke. Had recent dx of septic pneumonia 1 months ago & hospital stay at [place]. Patient was diagnosed with Covid today at PCP. Patient had sudden, crushing, central non radiating chest pain, PCP gave 2 sprays of nitro. Patient then had seizure-like acitivity per family, likely vasovagaled, BP was 70s/40s, hypoxic. Patient had no post-ictal state, patient is A&Ox3. Admitted with COVID infection and septic shock. Started on IV covid rx and IV abx.     #Norovirus  - Stool count  - GI PCR positive for Norovirus  - Supportive care  - PO Vancomycin discontinued  - Loperamide ordered    # Initially in triage had shock- 79/46mmHg, 132 bpm, 24/mt = septic shock  # Due to COVID infection  # Due to possible superimposed bacterial pneumonia  # vs shock sec to vasovagal episode  - Continue Remdesivir  - Continue Decadron  - Saturating well on room air  - Continue Zosyn for superimposed bacterial pneumonia    # Chest pain- resolved  - seen by cardiology  - atypical CP  - EKG NSR. 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

## 2024-12-16 LAB
ANION GAP SERPL CALC-SCNC: 11 MMOL/L — SIGNIFICANT CHANGE UP (ref 5–17)
ANISOCYTOSIS BLD QL: SLIGHT — SIGNIFICANT CHANGE UP
ANISOCYTOSIS BLD QL: SLIGHT — SIGNIFICANT CHANGE UP
BASOPHILS # BLD AUTO: 0 K/UL — SIGNIFICANT CHANGE UP (ref 0–0.2)
BASOPHILS # BLD AUTO: 0.33 K/UL — HIGH (ref 0–0.2)
BASOPHILS NFR BLD AUTO: 0 % — SIGNIFICANT CHANGE UP (ref 0–2)
BASOPHILS NFR BLD AUTO: 1.8 % — SIGNIFICANT CHANGE UP (ref 0–2)
BUN SERPL-MCNC: 31.4 MG/DL — HIGH (ref 8–20)
BURR CELLS BLD QL SMEAR: PRESENT — SIGNIFICANT CHANGE UP
C DIFF BY PCR RESULT: SIGNIFICANT CHANGE UP
CALCIUM SERPL-MCNC: 7.7 MG/DL — LOW (ref 8.4–10.5)
CHLORIDE SERPL-SCNC: 108 MMOL/L — SIGNIFICANT CHANGE UP (ref 96–108)
CO2 SERPL-SCNC: 22 MMOL/L — SIGNIFICANT CHANGE UP (ref 22–29)
CREAT SERPL-MCNC: 2.13 MG/DL — HIGH (ref 0.5–1.3)
CULTURE RESULTS: ABNORMAL
DACRYOCYTES BLD QL SMEAR: SLIGHT — SIGNIFICANT CHANGE UP
DACRYOCYTES BLD QL SMEAR: SLIGHT — SIGNIFICANT CHANGE UP
EGFR: 32 ML/MIN/1.73M2 — LOW
ELLIPTOCYTES BLD QL SMEAR: SLIGHT — SIGNIFICANT CHANGE UP
ELLIPTOCYTES BLD QL SMEAR: SLIGHT — SIGNIFICANT CHANGE UP
EOSINOPHIL # BLD AUTO: 0.17 K/UL — SIGNIFICANT CHANGE UP (ref 0–0.5)
EOSINOPHIL # BLD AUTO: 0.45 K/UL — SIGNIFICANT CHANGE UP (ref 0–0.5)
EOSINOPHIL NFR BLD AUTO: 0.9 % — SIGNIFICANT CHANGE UP (ref 0–6)
EOSINOPHIL NFR BLD AUTO: 1.8 % — SIGNIFICANT CHANGE UP (ref 0–6)
GLUCOSE BLDC GLUCOMTR-MCNC: 100 MG/DL — HIGH (ref 70–99)
GLUCOSE BLDC GLUCOMTR-MCNC: 101 MG/DL — HIGH (ref 70–99)
GLUCOSE BLDC GLUCOMTR-MCNC: 103 MG/DL — HIGH (ref 70–99)
GLUCOSE BLDC GLUCOMTR-MCNC: 108 MG/DL — HIGH (ref 70–99)
GLUCOSE BLDC GLUCOMTR-MCNC: 142 MG/DL — HIGH (ref 70–99)
GLUCOSE BLDC GLUCOMTR-MCNC: 47 MG/DL — CRITICAL LOW (ref 70–99)
GLUCOSE BLDC GLUCOMTR-MCNC: 48 MG/DL — CRITICAL LOW (ref 70–99)
GLUCOSE SERPL-MCNC: 53 MG/DL — CRITICAL LOW (ref 70–99)
HCT VFR BLD CALC: 27 % — LOW (ref 39–50)
HGB BLD-MCNC: 8.4 G/DL — LOW (ref 13–17)
HYPOCHROMIA BLD QL: SLIGHT — SIGNIFICANT CHANGE UP
LYMPHOCYTES # BLD AUTO: 0.87 K/UL — LOW (ref 1–3.3)
LYMPHOCYTES # BLD AUTO: 1.14 K/UL — SIGNIFICANT CHANGE UP (ref 1–3.3)
LYMPHOCYTES # BLD AUTO: 3.5 % — LOW (ref 13–44)
LYMPHOCYTES # BLD AUTO: 6.1 % — LOW (ref 13–44)
MACROCYTES BLD QL: SLIGHT — SIGNIFICANT CHANGE UP
MANUAL SMEAR VERIFICATION: SIGNIFICANT CHANGE UP
MANUAL SMEAR VERIFICATION: SIGNIFICANT CHANGE UP
MCHC RBC-ENTMCNC: 28.8 PG — SIGNIFICANT CHANGE UP (ref 27–34)
MCHC RBC-ENTMCNC: 31.1 G/DL — LOW (ref 32–36)
MCV RBC AUTO: 92.5 FL — SIGNIFICANT CHANGE UP (ref 80–100)
METAMYELOCYTES # FLD: 3.5 % — HIGH (ref 0–0)
METAMYELOCYTES # FLD: 6.1 % — HIGH (ref 0–0)
MICROCYTES BLD QL: SLIGHT — SIGNIFICANT CHANGE UP
MONOCYTES # BLD AUTO: 0.65 K/UL — SIGNIFICANT CHANGE UP (ref 0–0.9)
MONOCYTES # BLD AUTO: 1.79 K/UL — HIGH (ref 0–0.9)
MONOCYTES NFR BLD AUTO: 2.6 % — SIGNIFICANT CHANGE UP (ref 2–14)
MONOCYTES NFR BLD AUTO: 9.6 % — SIGNIFICANT CHANGE UP (ref 2–14)
MYELOCYTES NFR BLD: 17.6 % — HIGH (ref 0–0)
MYELOCYTES NFR BLD: 6.1 % — HIGH (ref 0–0)
NEUTROPHILS # BLD AUTO: 12.75 K/UL — HIGH (ref 1.8–7.4)
NEUTROPHILS # BLD AUTO: 15.32 K/UL — HIGH (ref 1.8–7.4)
NEUTROPHILS NFR BLD AUTO: 58.8 % — SIGNIFICANT CHANGE UP (ref 43–77)
NEUTROPHILS NFR BLD AUTO: 66.7 % — SIGNIFICANT CHANGE UP (ref 43–77)
NEUTS BAND # BLD: 1.8 % — SIGNIFICANT CHANGE UP (ref 0–8)
NEUTS BAND # BLD: 2.6 % — SIGNIFICANT CHANGE UP (ref 0–8)
NRBC # BLD: 4 /100 WBCS — HIGH (ref 0–0)
OVALOCYTES BLD QL SMEAR: SLIGHT — SIGNIFICANT CHANGE UP
OVALOCYTES BLD QL SMEAR: SLIGHT — SIGNIFICANT CHANGE UP
PLAT MORPH BLD: NORMAL — SIGNIFICANT CHANGE UP
PLAT MORPH BLD: NORMAL — SIGNIFICANT CHANGE UP
PLATELET # BLD AUTO: 265 K/UL — SIGNIFICANT CHANGE UP (ref 150–400)
POIKILOCYTOSIS BLD QL AUTO: SLIGHT — SIGNIFICANT CHANGE UP
POIKILOCYTOSIS BLD QL AUTO: SLIGHT — SIGNIFICANT CHANGE UP
POLYCHROMASIA BLD QL SMEAR: SIGNIFICANT CHANGE UP
POLYCHROMASIA BLD QL SMEAR: SLIGHT — SIGNIFICANT CHANGE UP
POTASSIUM SERPL-MCNC: 3.3 MMOL/L — LOW (ref 3.5–5.3)
POTASSIUM SERPL-SCNC: 3.3 MMOL/L — LOW (ref 3.5–5.3)
PROMYELOCYTES # FLD: 3.5 % — HIGH (ref 0–0)
PROMYELOCYTES # FLD: 7 % — HIGH (ref 0–0)
RBC # BLD: 2.92 M/UL — LOW (ref 4.2–5.8)
RBC # FLD: 17.1 % — HIGH (ref 10.3–14.5)
RBC BLD AUTO: ABNORMAL
RBC BLD AUTO: ABNORMAL
SODIUM SERPL-SCNC: 141 MMOL/L — SIGNIFICANT CHANGE UP (ref 135–145)
SPECIMEN SOURCE: SIGNIFICANT CHANGE UP
WBC # BLD: 18.61 K/UL — HIGH (ref 3.8–10.5)
WBC # FLD AUTO: 18.61 K/UL — HIGH (ref 3.8–10.5)

## 2024-12-16 PROCEDURE — G0316 PROLONG INPT EVAL ADD15 M: CPT

## 2024-12-16 PROCEDURE — 99223 1ST HOSP IP/OBS HIGH 75: CPT

## 2024-12-16 PROCEDURE — 99233 SBSQ HOSP IP/OBS HIGH 50: CPT

## 2024-12-16 RX ORDER — POTASSIUM CHLORIDE 600 MG/1
20 TABLET, EXTENDED RELEASE ORAL
Refills: 0 | Status: COMPLETED | OUTPATIENT
Start: 2024-12-16 | End: 2024-12-16

## 2024-12-16 RX ORDER — LORAZEPAM 2 MG/1
0.5 TABLET ORAL ONCE
Refills: 0 | Status: DISCONTINUED | OUTPATIENT
Start: 2024-12-16 | End: 2024-12-16

## 2024-12-16 RX ORDER — ACETAMINOPHEN 500MG 500 MG/1
1000 TABLET, COATED ORAL ONCE
Refills: 0 | Status: COMPLETED | OUTPATIENT
Start: 2024-12-16 | End: 2024-12-16

## 2024-12-16 RX ADMIN — Medication 81 MILLIGRAM(S): at 11:54

## 2024-12-16 RX ADMIN — Medication 25 GRAM(S): at 07:02

## 2024-12-16 RX ADMIN — METOPROLOL TARTRATE 25 MILLIGRAM(S): 100 TABLET, FILM COATED ORAL at 06:36

## 2024-12-16 RX ADMIN — PIPERACILLIN SODIUM AND TAZOBACTAM SODIUM 25 GRAM(S): 4; .5 INJECTION, POWDER, LYOPHILIZED, FOR SOLUTION INTRAVENOUS at 06:36

## 2024-12-16 RX ADMIN — FAMOTIDINE 10 MILLIGRAM(S): 20 TABLET, FILM COATED ORAL at 11:53

## 2024-12-16 RX ADMIN — ACETAMINOPHEN 500MG 1000 MILLIGRAM(S): 500 TABLET, COATED ORAL at 18:00

## 2024-12-16 RX ADMIN — Medication 5 UNIT(S): at 11:53

## 2024-12-16 RX ADMIN — POTASSIUM CHLORIDE 20 MILLIEQUIVALENT(S): 600 TABLET, EXTENDED RELEASE ORAL at 15:58

## 2024-12-16 RX ADMIN — PANTOPRAZOLE SODIUM 40 MILLIGRAM(S): 40 TABLET, DELAYED RELEASE ORAL at 06:35

## 2024-12-16 RX ADMIN — ACETAMINOPHEN 500MG 400 MILLIGRAM(S): 500 TABLET, COATED ORAL at 16:56

## 2024-12-16 RX ADMIN — LORAZEPAM 0.5 MILLIGRAM(S): 2 TABLET ORAL at 01:23

## 2024-12-16 RX ADMIN — LORAZEPAM 0.5 MILLIGRAM(S): 2 TABLET ORAL at 09:16

## 2024-12-16 RX ADMIN — SODIUM BICARBONATE 1300 MILLIGRAM(S): 84 INJECTION, SOLUTION INTRAVENOUS at 15:59

## 2024-12-16 RX ADMIN — Medication 2 MILLIGRAM(S): at 01:22

## 2024-12-16 RX ADMIN — SODIUM BICARBONATE 1300 MILLIGRAM(S): 84 INJECTION, SOLUTION INTRAVENOUS at 06:36

## 2024-12-16 RX ADMIN — POTASSIUM CHLORIDE 20 MILLIEQUIVALENT(S): 600 TABLET, EXTENDED RELEASE ORAL at 09:16

## 2024-12-16 RX ADMIN — LORAZEPAM 0.5 MILLIGRAM(S): 2 TABLET ORAL at 18:09

## 2024-12-16 RX ADMIN — Medication 5 UNIT(S): at 08:22

## 2024-12-16 RX ADMIN — DEXAMETHASONE 6 MILLIGRAM(S): 1.5 TABLET ORAL at 06:36

## 2024-12-16 RX ADMIN — Medication 5000 UNIT(S): at 06:35

## 2024-12-16 RX ADMIN — ROSUVASTATIN CALCIUM 20 MILLIGRAM(S): 5 TABLET, FILM COATED ORAL at 23:41

## 2024-12-16 RX ADMIN — Medication 25 GRAM(S): at 01:23

## 2024-12-16 RX ADMIN — Medication 2 MILLIGRAM(S): at 09:54

## 2024-12-16 RX ADMIN — POTASSIUM CHLORIDE 20 MILLIEQUIVALENT(S): 600 TABLET, EXTENDED RELEASE ORAL at 11:54

## 2024-12-16 RX ADMIN — SODIUM BICARBONATE 1300 MILLIGRAM(S): 84 INJECTION, SOLUTION INTRAVENOUS at 23:40

## 2024-12-16 RX ADMIN — Medication 5 UNIT(S): at 16:57

## 2024-12-16 RX ADMIN — INSULIN GLARGINE 40 UNIT(S): 100 INJECTION, SOLUTION SUBCUTANEOUS at 01:22

## 2024-12-16 RX ADMIN — Medication 5000 UNIT(S): at 18:09

## 2024-12-16 NOTE — PROGRESS NOTE ADULT - SUBJECTIVE AND OBJECTIVE BOX
Chief complaint: PNA/COVID/Norovirus    Patient seen and examined at bedside. No acute overnight events reported. No fever, chills, nausea or vomiting. Still having frequent watery BMs.     Vital Signs Last 24 Hrs  T(F): 97.5 (16 Dec 2024 04:55), Max: 97.9 (15 Dec 2024 21:30)  HR: 92 (16 Dec 2024 04:55) (79 - 92)  BP: 169/71 (16 Dec 2024 04:55) (138/62 - 169/71)  RR: 18 (16 Dec 2024 04:55) (18 - 19)  SpO2: 96% (16 Dec 2024 04:55) (94% - 100%)    Physical Exam:  Constitutional: alert and oriented, in no acute distress   Neck: Soft and supple  Respiratory: Clear to auscultation bilaterally  Cardiovascular: Regular rate and rhyhtm  Gastrointestinal: Soft, non-tender to palpation, +bs  Vascular: 2+ peripheral pulses  Neurological: A/O x 3  Musculoskeletal: no lower extremity edema bilaterally    Labs:                        8.4    18.61 )-----------( 265      ( 16 Dec 2024 05:25 )             27.0   12-16    141  |  108  |  31.4[H]  ----------------------------<  53[LL]  3.3[L]   |  22.0  |  2.13[H]    Ca    7.7[L]      16 Dec 2024 05:25  Phos  4.0     12-14  Mg     1.8     12-14    TPro  6.6  /  Alb  3.3  /  TBili  0.2[L]  /  DBili  x   /  AST  27  /  ALT  26  /  AlkPhos  80  12-14

## 2024-12-16 NOTE — CONSULT NOTE ADULT - TIME BILLING
This includes chart review, patient assessment, discussion with Medical team. Antibiotic dosing and management with clinical pharmacy.

## 2024-12-16 NOTE — PROGRESS NOTE ADULT - ASSESSMENT
74 y.o. male with hx of coronary artery disease s/p drug-eluting stent therapy of the proximal LAD in November 2022 , carotid artery disease (80% left carotid stenosis), peripheral arterial disease right SFA stent therapy at that time and then subsequently underwent left SFA stent therapy in 2023, hypertension, hyperlipidemia and lung cancer treated with chemotherapy and radiation and apparently has no evidence of disease at this time. He presented to [facility] in February 2024 with a left occipital stroke. Had recent dx of septic pneumonia 1 months ago & hospital stay at [place]. Patient was diagnosed with Covid today at PCP. Patient had sudden, crushing, central non radiating chest pain, PCP gave 2 sprays of nitro. Patient then had seizure-like acitivity per family, likely vasovagaled, BP was 70s/40s, hypoxic. Patient had no post-ictal state, patient is A&Ox3. Admitted with COVID infection and septic shock. Started on IV covid rx and IV abx.     #Norovirus  - GI PCR positive for Norovirus  - Supportive care  - PO Vancomycin discontinued  - CT A/P: Marked gastric distention which may be secondary to gastroparesis.  Right lower lobe pneumonia. Bilateral lower lobe bronchiectasis.    #Hypokalemia  - K 3.3  - Repleted  - Monitor BMP    # Initially in triage had shock- 79/46mmHg, 132 bpm, 24/mt = septic shock  # Due to COVID infection  # Due to possible superimposed bacterial pneumonia  # vs shock sec to vasovagal episode  - Continue Remdesivir  - Continue Decadron  - Saturating well on room air  - Discontinue Zosyn    # Chest pain  - resolved  - seen by cardiology  - atypical CP  - EKG NSR. 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  - avoid nephrotoxics  - renal USG negative for obstruction, Kidneys WNL  - on PO bicarb    # AOCD sec to chemo and CKD  - Hgb 8-9 range  - BT if hgb 7 or less  - can f/u renal in o/p and consider Epo     # Hx DM with hyperglycemia; HgbA1c is 8.8  - FS with ISS  - Admelog 5 units TID with meals  - Lantus 40 units nightly    # Hx CAD, PAD  - Aspirin 81mg daily  - Toprol XL 25mg daily  - Crestor 20mg nightly    # Hx lung cancer  -Outpatient oncology follow up    DVT ppx  - Heparin SQ    Dispo: Medically acute, pending improvement in diarrhea

## 2024-12-16 NOTE — CONSULT NOTE ADULT - ASSESSMENT
74-year-old male with history of CAD, PCI, PAD, CVA, CKD 4, right-sided lung cancer treated with chemotherapy and radiation.  Patient with recent prolonged hospitalization at [facility] in November 2020 for which he was admitted for sepsis, respiratory failure, multifocal pneumonia treated with a multi course of antibiotics with readmission to [facility] later that same month and treated again for pneumonia.  He was discharged recently being seen by his primary care physician and was also on oral antibiotics at that time.  Was sent to the emergency room by his primary care physician and presented here on December 10, 2024 after being found short of breath at his primary care physician's office.  Here patient was found to be hypotensive with tachycardia and hypoxic on room air to 90% saturation.  Hypotension resolved after IV fluids.  He was found to have right-sided infiltrate on chest x-ray.  He swab positive for COVID-19.  He was afebrile during the ER stay.  He was admitted to the medicine service and was treated with remdesivir 12/11 - 12/15; piperacillin/tazobactam 12/10 - present, IV steroids and remdesivir.  During his hospital course patient was also seen by cardiology for chest pain.  He was managed for his CAMRON by the medicine team.  His hypoxia improved.  He started having diarrhea on December 14 according to the notes.  However patient reports he has been having diarrhea since his second discharge from [facility].  He has been afebrile initially during his hospital stay however in December 14 he also spiked fever to 101F.  His blood cultures from admission were negative.  Sputum culture reported normal jose.  He had a stool culture and GI PCR done on December 14.  GI PCR reporting norovirus.  Patient continues to have diarrhea.  Infectious diseases evaluation requested December 16, 2024.      Viral Diarrhea with  Norovirus  Leukocytosis  Fever   s/p treatment for PNA  Lung Ca s/p Chemo/Rt  Immunosuppressed due to chemotherapy and recent steroids      - Blood cultures 12/10 no growth   - Repeat blood cultures if febrile  - GI PCR + Norovirus   - COVID 19 PCR + SARS-CoV-2 12/10/24  - CT A/P w/o contrast reporting Marked gastric distention which may be secondary to gastroparesis without demonstrated obstructing lesion. No bowel obstruction.  - Procalcitonin level 0.3 --> 0.2  - s/p remdesivir 12/11 - 12/15  - D/C Zosyn  - D/C Dexamethasone   - Supportive care  - If diarrhea continues will check stool for C diff given patient has had multiple Antibiotic treatment since the begining of November 2024  - Hold off on PICC/Midline for now unless needed for reasons other than infectious diseases  - Follow up cultures  - Trend Fever  - Trend WBC      Thank you for allowing me to participate in the care of your patient.   Will Follow    Discussed treatment plan with: Dr Simms/Michelle PA

## 2024-12-16 NOTE — CONSULT NOTE ADULT - SUBJECTIVE AND OBJECTIVE BOX
[facility]  INFECTIOUS DISEASES at [place] / [place] / [place]  =======================================================                              Gustabo Velasquez MD                              Professor Emeritus:  Dr Dion Sales MD            Diplomates American Board of Internal Medicine & Infectious Diseases                                   Tel  [phone] Fax [phone]                                  Hospital Consult line:  [phone]  =======================================================      MRN-[id number]  JADE MESSINA    CC: Patient is a 74y old  Male who presents with a chief complaint of Sepsis due to bacterial PNA and COVID, hypotension (16 Dec 2024 10:46)      74-year-old male with history of CAD, PCI, PAD, CVA, CKD 4, right-sided lung cancer treated with chemotherapy and radiation.  Patient with recent prolonged hospitalization at [facility] in November 2020 for which he was admitted for sepsis, respiratory failure, multifocal pneumonia treated with a multi course of antibiotics with readmission to [facility] later that same month and treated again for pneumonia.  He was discharged recently being seen by his primary care physician and was also on oral antibiotics at that time.  Was sent to the emergency room by his primary care physician and presented here on December 10, 2024 after being found short of breath at his primary care physician's office.  Here patient was found to be hypotensive with tachycardia and hypoxic on room air to 90% saturation.  Hypotension resolved after IV fluids.  He was found to have right-sided infiltrate on chest x-ray.  He swab positive for COVID-19.  He was afebrile during the ER stay.  He was admitted to the medicine service and was treated with remdesivir 12/11 - 12/15; piperacillin/tazobactam 12/10 - Present, IV steroids and remdesivir.  During his hospital course patient was also seen by cardiology for chest pain.  He was managed for his CAMRON by the medicine team.  His hypoxia improved.  He started having diarrhea on December 14 according to the notes.  However patient reports he has been having diarrhea since his second discharge from [facility].  He has been afebrile initially during his hospital stay however in December 14 he also spiked fever to 101F.  His blood cultures from admission were negative.  Sputum culture reported normal jose.  He had a stool culture and GI PCR done on December 14.  GI PCR reporting norovirus.  Patient continues to have diarrhea.  Infectious diseases evaluation requested December 16, 2024.        Past Medical & Surgical Hx:  Other nonspecific abnormal finding of lung field  CAD (coronary artery disease)  PVD (peripheral vascular disease)  H/O vasculitis  Schatzki's ring  Pulmonary nodules  HINA (obstructive sleep apnea)  Insulin dependent type 2 diabetes mellitus  HLD (hyperlipidemia)  Stage 4 chronic kidney disease  Diabetic neuropathy  History of cardiac cath  H/O heart artery stent  H/O colonoscopy      Social Hx:  Former smoker       FAMILY HISTORY:  FH: epilepsy (Mother)  FH: alcohol abuse (Father)      Allergies  sulfa topicals (Unknown)  Plavix (Stomach Upset)       REVIEW OF SYSTEMS:  CONSTITUTIONAL:  No Fever or chills  HEENT:  No diplopia or blurred vision.  No earache, sore throat or runny nose.  CARDIOVASCULAR:  No chest pain  RESPIRATORY:  No cough, shortness of breath  GASTROINTESTINAL:  No nausea, vomiting. + diarrhea.  GENITOURINARY:  No dysuria, frequency or urgency. No Blood in urine  MUSCULOSKELETAL:  no joint aches, no muscle pain  SKIN:  No change in skin, hair or nails.  NEUROLOGIC:  No Headaches      Physical Exam:  GEN: NAD  HEENT: normocephalic and atraumatic. EOMI. PERRL.    NECK: Supple.   LUNGS: CTA B/L.  HEART: RRR  ABDOMEN: Soft, NT, ND.  +BS.    : No CVA tenderness  EXTREMITIES: Without  edema.  MSK: No joint swelling  NEUROLOGIC: No Focal Deficits   SKIN: No rash      Vitals:  T(F): 97.5 (16 Dec 2024 04:55), Max: 97.9 (15 Dec 2024 21:30)  HR: 92 (16 Dec 2024 04:55)  BP: 169/71 (16 Dec 2024 04:55)  RR: 18 (16 Dec 2024 04:55)  SpO2: 96% (16 Dec 2024 04:55) (94% - 100%)  temp max in last 48H T(F): , Max: 102 (12-14-24 @ 22:34)        Current Antibiotics:    Other medications:  aspirin enteric coated 81 milliGRAM(s) Oral daily  dexAMETHasone  Injectable 6 milliGRAM(s) IV Push daily  dextrose 5%. 1000 milliLiter(s) IV Continuous <Continuous>  dextrose 5%. 1000 milliLiter(s) IV Continuous <Continuous>  dextrose 50% Injectable 25 Gram(s) IV Push once  dextrose 50% Injectable 12.5 Gram(s) IV Push once  dextrose 50% Injectable 25 Gram(s) IV Push once  famotidine    Tablet 10 milliGRAM(s) Oral daily  glucagon  Injectable 1 milliGRAM(s) IntraMuscular once  heparin   Injectable 5000 Unit(s) SubCutaneous every 12 hours  insulin glargine Injectable (LANTUS) 40 Unit(s) SubCutaneous at bedtime  insulin lispro (ADMELOG) corrective regimen sliding scale   SubCutaneous three times a day before meals  insulin lispro Injectable (ADMELOG) 5 Unit(s) SubCutaneous three times a day before meals  metoprolol succinate ER 25 milliGRAM(s) Oral daily  pantoprazole    Tablet 40 milliGRAM(s) Oral before breakfast  potassium chloride    Tablet ER 20 milliEquivalent(s) Oral every 2 hours  rosuvastatin 20 milliGRAM(s) Oral at bedtime  sodium bicarbonate 1300 milliGRAM(s) Oral three times a day  sodium chloride 0.9%. 1000 milliLiter(s) IV Continuous <Continuous>                 8.4    18.61 )-----------( 265      ( 16 Dec 2024 05:25 )             27.0     12-16    141  |  108  |  31.4[H]  ----------------------------<  53[LL]  3.3[L]   |  22.0  |  2.13[H]    Ca    7.7[L]      16 Dec 2024 05:25  Phos  4.0     12-14  Mg     1.8     12-14    TPro  6.6  /  Alb  3.3  /  TBili  0.2[L]  /  DBili  x   /  AST  27  /  ALT  26  /  AlkPhos  80  12-14    RECENT CULTURES:  12-14 @ 11:20 .Stool     No enteric pathogens to date: Final culture pending  Moderate Yeast like cells  No enteric gram negative rods isolated    12-11 @ 21:30 .Sputum Sputum     Commensal jose consistent with body site  No polymorphonuclear leukocytes per low power field  Rare Squamous epithelial cells per low power field  Rare Gram positive cocci in pairs per oil power field    12-10 @ 12:25 .Blood BLOOD     No growth at 5 days      WBC Count: 18.61 K/uL (12-16-24 @ 05:25)  WBC Count: 17.35 K/uL (12-15-24 @ 05:50)  WBC Count: 24.95 K/uL (12-14-24 @ 22:35)  WBC Count: 15.23 K/uL (12-14-24 @ 05:40)  WBC Count: 14.89 K/uL (12-13-24 @ 06:16)  WBC Count: 16.59 K/uL (12-12-24 @ 05:24)    Creatinine: 2.13 mg/dL (12-16-24 @ 05:25)  Creatinine: 2.41 mg/dL (12-15-24 @ 05:50)  Creatinine: 2.72 mg/dL (12-14-24 @ 22:35)  Creatinine: 2.66 mg/dL (12-14-24 @ 05:40)  Creatinine: 2.82 mg/dL (12-13-24 @ 06:16)  Creatinine: 3.19 mg/dL (12-12-24 @ 05:24)      Procalcitonin: 0.20 ng/mL (12-14-24 @ 22:35)  Procalcitonin: 0.30 ng/mL (12-11-24 @ 02:03)     SARS-CoV-2 Result: Detected (12-10-24 @ 12:40)      GI PCR Panel Stool (12.14.24 @ 11:20)    GI PCR Panel:   Norovirus GI/GII: Detected            < from: CT Abdomen and Pelvis No Cont (12.15.24 @ 13:12) >    ACC: [id number] EXAM:  CT ABDOMEN AND PELVIS   ORDERED BY: PRIYA MEADE     PROCEDURE DATE:  12/15/2024      INTERPRETATION:  CLINICAL INFORMATION: 74-year-old man with abdominal   pain and multiple episodes of diarrhea. Admitted with sepsis secondary to   bacterial pneumonia, Covid positive, hypotension    COMPARISON: None.    CONTRAST/COMPLICATIONS:  IV Contrast: NONE  Oral Contrast: NONE  .    PROCEDURE:  CT of the Abdomen and Pelvis was performed.  Sagittal and coronal reformats were performed.    FINDINGS:  LOWER CHEST: Coronary artery calcification. Right lower lobe groundglass   and solid consolidation. Bilateral lower lobe bronchiectasis.    LIVER: Within normal limits.  BILE DUCTS: Normal caliber.  GALLBLADDER: Contracted.  SPLEEN: Within normal limits.  PANCREAS: Within normal limits.  ADRENALS: Within normal limits.  KIDNEYS/URETERS: Within normal limits.    BLADDER: Within normal limits.  REPRODUCTIVE ORGANS: Mildly enlarged prostate.    BOWEL: No bowel obstruction. Massive gastric distention without   demonstrated obstructing lesion. Appendix normal.  PERITONEUM/RETROPERITONEUM: Within normal limits.  VESSELS: Vascular calcification. Stent extending from right external   iliac artery to proximal femoral artery.  LYMPH NODES: No lymphadenopathy.  ABDOMINAL WALL: Small fat-containing bilateral inguinal hernias.  BONES: Degenerative change.    IMPRESSION:  Marked gastric distention which may be secondary to gastroparesis.  Right lower lobe pneumonia.  Bilateral lower lobe bronchiectasis.    Findings discussed with Dr. Simms on 12/15/2024 at 5:35 PM  with read back.    --- End of Report ---    < end of copied text >

## 2024-12-17 ENCOUNTER — TRANSCRIPTION ENCOUNTER (OUTPATIENT)
Age: 74
End: 2024-12-17

## 2024-12-17 LAB
ACANTHOCYTES BLD QL SMEAR: SLIGHT — SIGNIFICANT CHANGE UP
ALBUMIN SERPL ELPH-MCNC: 2.7 G/DL — LOW (ref 3.3–5.2)
ALP SERPL-CCNC: 55 U/L — SIGNIFICANT CHANGE UP (ref 40–120)
ALT FLD-CCNC: 20 U/L — SIGNIFICANT CHANGE UP
ANION GAP SERPL CALC-SCNC: 14 MMOL/L — SIGNIFICANT CHANGE UP (ref 5–17)
ANISOCYTOSIS BLD QL: SLIGHT — SIGNIFICANT CHANGE UP
AST SERPL-CCNC: 17 U/L — SIGNIFICANT CHANGE UP
BASOPHILS # BLD AUTO: 0.49 K/UL — HIGH (ref 0–0.2)
BASOPHILS NFR BLD AUTO: 3.5 % — HIGH (ref 0–2)
BILIRUB DIRECT SERPL-MCNC: 0.1 MG/DL — SIGNIFICANT CHANGE UP (ref 0–0.3)
BILIRUB INDIRECT FLD-MCNC: 0.1 MG/DL — LOW (ref 0.2–1)
BILIRUB SERPL-MCNC: 0.2 MG/DL — LOW (ref 0.4–2)
BUN SERPL-MCNC: 31.3 MG/DL — HIGH (ref 8–20)
CALCIUM SERPL-MCNC: 8.1 MG/DL — LOW (ref 8.4–10.5)
CHLORIDE SERPL-SCNC: 109 MMOL/L — HIGH (ref 96–108)
CO2 SERPL-SCNC: 20 MMOL/L — LOW (ref 22–29)
CREAT SERPL-MCNC: 2.02 MG/DL — HIGH (ref 0.5–1.3)
DACRYOCYTES BLD QL SMEAR: SLIGHT — SIGNIFICANT CHANGE UP
EGFR: 34 ML/MIN/1.73M2 — LOW
EOSINOPHIL # BLD AUTO: 0.13 K/UL — SIGNIFICANT CHANGE UP (ref 0–0.5)
EOSINOPHIL NFR BLD AUTO: 0.9 % — SIGNIFICANT CHANGE UP (ref 0–6)
GIANT PLATELETS BLD QL SMEAR: PRESENT — SIGNIFICANT CHANGE UP
GLUCOSE BLDC GLUCOMTR-MCNC: 101 MG/DL — HIGH (ref 70–99)
GLUCOSE BLDC GLUCOMTR-MCNC: 113 MG/DL — HIGH (ref 70–99)
GLUCOSE BLDC GLUCOMTR-MCNC: 122 MG/DL — HIGH (ref 70–99)
GLUCOSE BLDC GLUCOMTR-MCNC: 122 MG/DL — HIGH (ref 70–99)
GLUCOSE BLDC GLUCOMTR-MCNC: 62 MG/DL — LOW (ref 70–99)
GLUCOSE BLDC GLUCOMTR-MCNC: 63 MG/DL — LOW (ref 70–99)
GLUCOSE BLDC GLUCOMTR-MCNC: 70 MG/DL — SIGNIFICANT CHANGE UP (ref 70–99)
GLUCOSE BLDC GLUCOMTR-MCNC: 92 MG/DL — SIGNIFICANT CHANGE UP (ref 70–99)
GLUCOSE SERPL-MCNC: 95 MG/DL — SIGNIFICANT CHANGE UP (ref 70–99)
HCT VFR BLD CALC: 30.5 % — LOW (ref 39–50)
HGB BLD-MCNC: 9.4 G/DL — LOW (ref 13–17)
LYMPHOCYTES # BLD AUTO: 15.7 % — SIGNIFICANT CHANGE UP (ref 13–44)
LYMPHOCYTES # BLD AUTO: 2.22 K/UL — SIGNIFICANT CHANGE UP (ref 1–3.3)
MANUAL SMEAR VERIFICATION: SIGNIFICANT CHANGE UP
MCHC RBC-ENTMCNC: 29 PG — SIGNIFICANT CHANGE UP (ref 27–34)
MCHC RBC-ENTMCNC: 30.8 G/DL — LOW (ref 32–36)
MCV RBC AUTO: 94.1 FL — SIGNIFICANT CHANGE UP (ref 80–100)
METAMYELOCYTES # FLD: 4.3 % — HIGH (ref 0–0)
MICROCYTES BLD QL: SLIGHT — SIGNIFICANT CHANGE UP
MONOCYTES # BLD AUTO: 0.86 K/UL — SIGNIFICANT CHANGE UP (ref 0–0.9)
MONOCYTES NFR BLD AUTO: 6.1 % — SIGNIFICANT CHANGE UP (ref 2–14)
MYELOCYTES NFR BLD: 4.3 % — HIGH (ref 0–0)
NEUTROPHILS # BLD AUTO: 8.1 K/UL — HIGH (ref 1.8–7.4)
NEUTROPHILS NFR BLD AUTO: 53.9 % — SIGNIFICANT CHANGE UP (ref 43–77)
NEUTS BAND # BLD: 3.5 % — SIGNIFICANT CHANGE UP (ref 0–8)
OVALOCYTES BLD QL SMEAR: SLIGHT — SIGNIFICANT CHANGE UP
PLAT MORPH BLD: ABNORMAL
PLATELET # BLD AUTO: 257 K/UL — SIGNIFICANT CHANGE UP (ref 150–400)
POLYCHROMASIA BLD QL SMEAR: SLIGHT — SIGNIFICANT CHANGE UP
POTASSIUM SERPL-MCNC: 4.1 MMOL/L — SIGNIFICANT CHANGE UP (ref 3.5–5.3)
POTASSIUM SERPL-SCNC: 4.1 MMOL/L — SIGNIFICANT CHANGE UP (ref 3.5–5.3)
PROMYELOCYTES # FLD: 5.2 % — HIGH (ref 0–0)
PROT SERPL-MCNC: 5.6 G/DL — LOW (ref 6.6–8.7)
RBC # BLD: 3.24 M/UL — LOW (ref 4.2–5.8)
RBC # FLD: 17.1 % — HIGH (ref 10.3–14.5)
RBC BLD AUTO: ABNORMAL
SMUDGE CELLS # BLD: PRESENT — SIGNIFICANT CHANGE UP
SODIUM SERPL-SCNC: 142 MMOL/L — SIGNIFICANT CHANGE UP (ref 135–145)
VARIANT LYMPHS # BLD: 2.6 % — SIGNIFICANT CHANGE UP (ref 0–6)
WBC # BLD: 14.11 K/UL — HIGH (ref 3.8–10.5)
WBC # FLD AUTO: 14.11 K/UL — HIGH (ref 3.8–10.5)

## 2024-12-17 PROCEDURE — 99232 SBSQ HOSP IP/OBS MODERATE 35: CPT

## 2024-12-17 PROCEDURE — 99233 SBSQ HOSP IP/OBS HIGH 50: CPT

## 2024-12-17 RX ADMIN — METOPROLOL TARTRATE 25 MILLIGRAM(S): 100 TABLET, FILM COATED ORAL at 05:46

## 2024-12-17 RX ADMIN — Medication 5000 UNIT(S): at 19:05

## 2024-12-17 RX ADMIN — Medication 15 GRAM(S): at 16:27

## 2024-12-17 RX ADMIN — FAMOTIDINE 10 MILLIGRAM(S): 20 TABLET, FILM COATED ORAL at 12:07

## 2024-12-17 RX ADMIN — SODIUM BICARBONATE 1300 MILLIGRAM(S): 84 INJECTION, SOLUTION INTRAVENOUS at 23:25

## 2024-12-17 RX ADMIN — Medication 81 MILLIGRAM(S): at 12:07

## 2024-12-17 RX ADMIN — LORAZEPAM 0.5 MILLIGRAM(S): 2 TABLET ORAL at 19:05

## 2024-12-17 RX ADMIN — Medication 2 MILLIGRAM(S): at 19:05

## 2024-12-17 RX ADMIN — PANTOPRAZOLE SODIUM 40 MILLIGRAM(S): 40 TABLET, DELAYED RELEASE ORAL at 05:46

## 2024-12-17 RX ADMIN — SODIUM BICARBONATE 1300 MILLIGRAM(S): 84 INJECTION, SOLUTION INTRAVENOUS at 14:09

## 2024-12-17 RX ADMIN — ACETAMINOPHEN 500MG 650 MILLIGRAM(S): 500 TABLET, COATED ORAL at 23:25

## 2024-12-17 RX ADMIN — SODIUM BICARBONATE 1300 MILLIGRAM(S): 84 INJECTION, SOLUTION INTRAVENOUS at 05:46

## 2024-12-17 RX ADMIN — ROSUVASTATIN CALCIUM 20 MILLIGRAM(S): 5 TABLET, FILM COATED ORAL at 23:25

## 2024-12-17 RX ADMIN — Medication 5 UNIT(S): at 08:08

## 2024-12-17 RX ADMIN — INSULIN GLARGINE 40 UNIT(S): 100 INJECTION, SOLUTION SUBCUTANEOUS at 23:26

## 2024-12-17 RX ADMIN — Medication 5 UNIT(S): at 12:06

## 2024-12-17 RX ADMIN — Medication 5000 UNIT(S): at 05:46

## 2024-12-17 NOTE — PROGRESS NOTE ADULT - ASSESSMENT
74 y.o. male with hx of coronary artery disease s/p drug-eluting stent therapy of the proximal LAD in November 2022 , carotid artery disease (80% left carotid stenosis), peripheral arterial disease right SFA stent therapy at that time and then subsequently underwent left SFA stent therapy in 2023, hypertension, hyperlipidemia and lung cancer treated with chemotherapy and radiation and apparently has no evidence of disease at this time. He presented to [facility] in February 2024 with a left occipital stroke. Had recent dx of septic pneumonia 1 months ago & hospital stay at [place]. Patient was diagnosed with Covid today at PCP. Patient had sudden, crushing, central non radiating chest pain, PCP gave 2 sprays of nitro. Patient then had seizure-like acitivity per family, likely vasovagaled, BP was 70s/40s, hypoxic. Patient had no post-ictal state, patient is A&Ox3. Admitted with COVID infection and septic shock. Started on IV covid rx and IV abx. Completed anti viral therapy, diarrhea persists however C. diff negative, likely in setting of norovirus. Seen by ID with supportive care recommended. Patient medically stable for DC home, however family requesting to be monitored further given persistent diarrhea.     #Norovirus  - GI PCR positive for Norovirus  - Supportive care  - PO Vancomycin discontinued  seen by ID, Steroids / Abx dc'd, continue supportive care   start loperamide as needed   - CT A/P: Marked gastric distention which may be secondary to gastroparesis.  Right lower lobe pneumonia. Bilateral lower lobe bronchiectasis.    #Hypokalemia   - Repleted  - Monitor BMP    # Initially in triage had shock- 79/46mmHg, 132 bpm, 24/mt = septic shock  # Due to COVID infection  # Due to possible superimposed bacterial pneumonia  # vs shock sec to vasovagal episode  completed remdesivir / decadron, as per ID, stopped steroids / abx's   - Saturating well on room air  - Discontinue Zosyn    # Chest pain  - resolved  - seen by cardiology  - atypical CP  - EKG NSR. 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  - avoid nephrotoxics  - renal USG negative for obstruction, Kidneys WNL  - on PO bicarb    # AOCD sec to chemo and CKD  - Hgb 8-9 range  - BT if hgb 7 or less  - can f/u renal in o/p and consider Epo     # Hx DM with hyperglycemia; HgbA1c is 8.8  - FS with ISS  - Admelog 5 units TID with meals  - Lantus 40 units nightly    # Hx CAD, PAD  - Aspirin 81mg daily  - Toprol XL 25mg daily  - Crestor 20mg nightly    # Hx lung cancer  -Outpatient oncology follow up    DVT ppx  - Heparin SQ    Dispo: Medically stable for DC home, will monitor overnight plan for DC in AM if remains stable

## 2024-12-17 NOTE — PROGRESS NOTE ADULT - ASSESSMENT
74-year-old male with history of CAD, PCI, PAD, CVA, CKD 4, right-sided lung cancer treated with chemotherapy and radiation.  Patient with recent prolonged hospitalization at [facility] in November 2020 for which he was admitted for sepsis, respiratory failure, multifocal pneumonia treated with a multi course of antibiotics with readmission to [facility] later that same month and treated again for pneumonia.  He was discharged recently being seen by his primary care physician and was also on oral antibiotics at that time.  Was sent to the emergency room by his primary care physician and presented here on December 10, 2024 after being found short of breath at his primary care physician's office.  Here patient was found to be hypotensive with tachycardia and hypoxic on room air to 90% saturation.  Hypotension resolved after IV fluids.  He was found to have right-sided infiltrate on chest x-ray.  He swab positive for COVID-19.  He was afebrile during the ER stay.  He was admitted to the medicine service and was treated with remdesivir 12/11 - 12/15; piperacillin/tazobactam 12/10 - present, IV steroids and remdesivir.  During his hospital course patient was also seen by cardiology for chest pain.  He was managed for his CAMRON by the medicine team.  His hypoxia improved.  He started having diarrhea on December 14 according to the notes.  However patient reports he has been having diarrhea since his second discharge from [facility].  He has been afebrile initially during his hospital stay however in December 14 he also spiked fever to 101F.  His blood cultures from admission were negative.  Sputum culture reported normal jose.  He had a stool culture and GI PCR done on December 14.  GI PCR reporting norovirus.  Patient continues to have diarrhea.  Infectious diseases evaluation requested December 16, 2024.      Viral Diarrhea with  Norovirus  Leukocytosis  Fever   s/p treatment for PNA  Lung Ca s/p Chemo/Rt  Immunosuppressed due to chemotherapy and recent steroids      - Blood cultures 12/10 no growth   - Repeat blood cultures if febrile  - GI PCR + Norovirus   - COVID 19 PCR + SARS-CoV-2 12/10/24  - CT A/P w/o contrast reporting Marked gastric distention which may be secondary to gastroparesis without demonstrated obstructing lesion. No bowel obstruction.  - Procalcitonin level 0.3 --> 0.2  - s/p remdesivir 12/11 - 12/15  - D/C Zosyn  - D/C Dexamethasone   - Supportive care  - If diarrhea continues will check stool for C diff given patient has had multiple Antibiotic treatment since the begining of November 2024  - Hold off on PICC/Midline for now unless needed for reasons other than infectious diseases  - Follow up cultures  - Trend Fever  - Trend WBC      Thank you for allowing me to participate in the care of your patient.   Will sign off. Please call PRN.     Discussed treatment plan with: Michelle AMBRIZ

## 2024-12-17 NOTE — PROGRESS NOTE ADULT - TIME BILLING
Time spent reviewing the chart documentation, reviewing labs and imaging studies, evaluating the patient, discussing the plan of care with the consultants & medical team, and documenting.
Time spent reviewing the chart documentation, reviewing labs and imaging studies, evaluating the patient, discussing the plan of care with the consultants & medical team, and documenting.
Chart, labs, orders, vitals, and imaging reviewed and plan of care discussed with consultants and IDR team in detail. Plan of care discussed with patient at bedside.

## 2024-12-17 NOTE — CHART NOTE - NSCHARTNOTEFT_GEN_A_CORE
Nutrition :  Met with pt and wife. Reports some weight loss in the last year due to Ca. States feeling better with improved PO. Education provided. Aware of plan for d/c. RD to remain available

## 2024-12-17 NOTE — DISCHARGE NOTE PROVIDER - NSDCCPCAREPLAN_GEN_ALL_CORE_FT
PRINCIPAL DISCHARGE DIAGNOSIS  Diagnosis: Viral pneumonia  Assessment and Plan of Treatment: Resolving. Please follow up with your Primary Care Provider      SECONDARY DISCHARGE DIAGNOSES  Diagnosis: 2019 novel coronavirus disease (COVID-19)  Assessment and Plan of Treatment: Resolving. Please follow up with your Primary Care Provider    Diagnosis: Chest pain  Assessment and Plan of Treatment: Cardiac evaulation negative. Please take medications as prescribed and follow up with your Cardiologist.    Diagnosis: Norovirus  Assessment and Plan of Treatment: Symptoms improving. Please follow up with your Primary Care Provider or Infectious Disease Specialist.     PRINCIPAL DISCHARGE DIAGNOSIS  Diagnosis: Severe sepsis with septic shock  Assessment and Plan of Treatment: in setting of acute viral pneumonia COVID / RSV   Norovirus   improving at this time, off pressors / continues on home medications as prescribed   completed antivirals, no further abx's steroids as per ID, supportive care      SECONDARY DISCHARGE DIAGNOSES  Diagnosis: Viral pneumonia  Assessment and Plan of Treatment: Resolving. Please follow up with your Primary Care Provider  Home O2 evaluation performed without need for home oxygen    Diagnosis: 2019 novel coronavirus disease (COVID-19)  Assessment and Plan of Treatment: Resolving. Please follow up with your Primary Care Provider    Diagnosis: Chest pain  Assessment and Plan of Treatment: Cardiac evaulation negative. Please take medications as prescribed and follow up with your Cardiologist outpatient    Diagnosis: Norovirus  Assessment and Plan of Treatment: Symptoms improving. Please follow up with your Primary Care Provider or Infectious Disease Specialist.  may use loperamide as needed at home for persistent diarrhea

## 2024-12-17 NOTE — PROGRESS NOTE ADULT - PROVIDER SPECIALTY LIST ADULT
Cardiology
Hospitalist
Hospitalist
Infectious Disease
Internal Medicine
Cardiology
Hospitalist
Hospitalist
Internal Medicine
Internal Medicine

## 2024-12-17 NOTE — DISCHARGE NOTE NURSING/CASE MANAGEMENT/SOCIAL WORK - FINANCIAL ASSISTANCE
[facility] provides services at a reduced cost to those who are determined to be eligible through [facility]’s financial assistance program. Information regarding [facility]’s financial assistance program can be found by going to [email or web address] or by calling [phone].

## 2024-12-17 NOTE — DISCHARGE NOTE PROVIDER - CARE PROVIDERS DIRECT ADDRESSES
[email or web address]

## 2024-12-17 NOTE — DISCHARGE NOTE PROVIDER - NSDCFUSCHEDAPPT_GEN_ALL_CORE_FT
Pete Solares  [facility]  [id number]   Scheduled Appointment: 01/23/2025

## 2024-12-17 NOTE — DISCHARGE NOTE PROVIDER - CARE PROVIDER_API CALL
Carlos Manuel Singh J  Cardiovascular Disease  [address] [place] [address]  Phone: [phone]  Fax: [phone]  Follow Up Time:     Alfredo Brown  Infectious Disease  [address]  [place] [address]  Phone: [phone]  Fax: [phone]  Follow Up Time:

## 2024-12-17 NOTE — DISCHARGE NOTE PROVIDER - NSDCMRMEDTOKEN_GEN_ALL_CORE_FT
Albuterol (Eqv-ProAir HFA) 90 mcg/inh inhalation aerosol: 2 puff(s) inhaled every 6 hours as needed for  shortness of breath and/or wheezing  albuterol 2.5 mg/3 mL (0.083%) inhalation solution: 3 milliliter(s) by nebulizer 3 times a day as needed for  shortness of breath and/or wheezing  aspirin 81 mg oral tablet: 1 tab(s) orally once a day  famotidine 10 mg oral tablet: 1 tab(s) orally every other day  Lantus 100 units/mL subcutaneous solution: 36 unit(s) subcutaneous once a day (at bedtime)  metoprolol succinate 50 mg oral capsule, extended release: 1 cap(s) orally once a day  omeprazole 40 mg oral delayed release capsule: 1 cap(s) orally once a day  rosuvastatin 20 mg oral tablet: 1 tab(s) orally once a day  sodium bicarbonate 650 mg oral tablet: 2 tab(s) orally 3 times a day   Albuterol (Eqv-ProAir HFA) 90 mcg/inh inhalation aerosol: 2 puff(s) inhaled every 6 hours as needed for  shortness of breath and/or wheezing  albuterol 2.5 mg/3 mL (0.083%) inhalation solution: 3 milliliter(s) by nebulizer 3 times a day as needed for  shortness of breath and/or wheezing  aspirin 81 mg oral tablet: 1 tab(s) orally once a day  famotidine 10 mg oral tablet: 1 tab(s) orally every other day  Lantus 100 units/mL subcutaneous solution: 36 unit(s) subcutaneous once a day (at bedtime)  loperamide 2 mg oral capsule: 1 cap(s) orally every 4 hours As needed Diarrhea  metoprolol succinate 50 mg oral capsule, extended release: 1 cap(s) orally once a day  omeprazole 40 mg oral delayed release capsule: 1 cap(s) orally once a day  rosuvastatin 20 mg oral tablet: 1 tab(s) orally once a day  sodium bicarbonate 650 mg oral tablet: 2 tab(s) orally 3 times a day

## 2024-12-17 NOTE — PROGRESS NOTE ADULT - SUBJECTIVE AND OBJECTIVE BOX
[facility] Division of Hospital Medicine    Chief Complaint:      SUBJECTIVE / OVERNIGHT EVENTS:    Patient seen and examined at bedside, no acute events overnight, patient denies any new complaints. In AM endorsed improvement in diarrhea / abd pain, however in afternoon had 3 episodes of loose stools consecutively with associated abd pain. Patients infectious workup notable only for viral illness, including norovirus which would explain patients symptoms at this time. patient to be monitored overnight, remains medically stable for DC, will start loperamide, if remains afebrile with controlled symptoms plan for DC in AM.     MEDICATIONS  (STANDING):  aspirin enteric coated 81 milliGRAM(s) Oral daily  dextrose 5%. 1000 milliLiter(s) (50 mL/Hr) IV Continuous <Continuous>  dextrose 5%. 1000 milliLiter(s) (100 mL/Hr) IV Continuous <Continuous>  dextrose 50% Injectable 25 Gram(s) IV Push once  dextrose 50% Injectable 12.5 Gram(s) IV Push once  dextrose 50% Injectable 25 Gram(s) IV Push once  famotidine    Tablet 10 milliGRAM(s) Oral daily  glucagon  Injectable 1 milliGRAM(s) IntraMuscular once  heparin   Injectable 5000 Unit(s) SubCutaneous every 12 hours  insulin glargine Injectable (LANTUS) 40 Unit(s) SubCutaneous at bedtime  insulin lispro (ADMELOG) corrective regimen sliding scale   SubCutaneous three times a day before meals  insulin lispro Injectable (ADMELOG) 5 Unit(s) SubCutaneous three times a day before meals  metoprolol succinate ER 25 milliGRAM(s) Oral daily  pantoprazole    Tablet 40 milliGRAM(s) Oral before breakfast  rosuvastatin 20 milliGRAM(s) Oral at bedtime  sodium bicarbonate 1300 milliGRAM(s) Oral three times a day  sodium chloride 0.9%. 1000 milliLiter(s) (60 mL/Hr) IV Continuous <Continuous>    MEDICATIONS  (PRN):  acetaminophen     Tablet .. 650 milliGRAM(s) Oral every 6 hours PRN Temp greater or equal to 38C (100.4F), Mild Pain (1 - 3), Moderate Pain (4 - 6)  benzonatate 100 milliGRAM(s) Oral every 8 hours PRN Cough  dextrose Oral Gel 15 Gram(s) Oral once PRN Blood Glucose LESS THAN 70 milliGRAM(s)/deciliter  guaifenesin/dextromethorphan Oral Liquid 10 milliLiter(s) Oral every 4 hours PRN Cough  loperamide 2 milliGRAM(s) Oral every 4 hours PRN Diarrhea  LORazepam     Tablet 0.5 milliGRAM(s) Oral two times a day PRN Anxiety  melatonin 3 milliGRAM(s) Oral at bedtime PRN Insomnia  ondansetron Injectable 4 milliGRAM(s) IV Push every 6 hours PRN Nausea and/or Vomiting        I&O's Summary    16 Dec 2024 07:01  -  17 Dec 2024 07:00  --------------------------------------------------------  IN: 100 mL / OUT: 0 mL / NET: 100 mL        PHYSICAL EXAM:  Vital Signs Last 24 Hrs  T(C): 36.4 (17 Dec 2024 17:51), Max: 36.8 (17 Dec 2024 00:46)  T(F): 97.5 (17 Dec 2024 17:51), Max: 98.3 (17 Dec 2024 00:46)  HR: 79 (17 Dec 2024 17:51) (74 - 79)  BP: 132/62 (17 Dec 2024 17:51) (115/64 - 147/72)  BP(mean): --  RR: 18 (17 Dec 2024 17:51) (18 - 18)  SpO2: 95% (17 Dec 2024 17:51) (94% - 99%)    Parameters below as of 17 Dec 2024 17:51  Patient On (Oxygen Delivery Method): room air      Constitutional: alert and oriented, in no acute distress   Neck: Soft and supple  Respiratory: Clear to auscultation bilaterally  Cardiovascular: Regular rate and rhyhtm  Gastrointestinal: Soft, non-tender to palpation, +bs  Vascular: 2+ peripheral pulses  Neurological: A/O x 3  Musculoskeletal: no lower extremity edema bilaterally      LABS:                        9.4    14.11 )-----------( 257      ( 17 Dec 2024 05:34 )             30.5     12-17    142  |  109[H]  |  31.3[H]  ----------------------------<  95  4.1   |  20.0[L]  |  2.02[H]    Ca    8.1[L]      17 Dec 2024 05:34    TPro  5.6[L]  /  Alb  2.7[L]  /  TBili  0.2[L]  /  DBili  0.1  /  AST  17  /  ALT  20  /  AlkPhos  55  12-17          Urinalysis Basic - ( 17 Dec 2024 05:34 )    Color: x / Appearance: x / SG: x / pH: x  Gluc: 95 mg/dL / Ketone: x  / Bili: x / Urobili: x   Blood: x / Protein: x / Nitrite: x   Leuk Esterase: x / RBC: x / WBC x   Sq Epi: x / Non Sq Epi: x / Bacteria: x        CAPILLARY BLOOD GLUCOSE      POCT Blood Glucose.: 92 mg/dL (17 Dec 2024 18:24)  POCT Blood Glucose.: 70 mg/dL (17 Dec 2024 16:46)  POCT Blood Glucose.: 62 mg/dL (17 Dec 2024 16:22)  POCT Blood Glucose.: 63 mg/dL (17 Dec 2024 16:20)  POCT Blood Glucose.: 122 mg/dL (17 Dec 2024 12:04)  POCT Blood Glucose.: 113 mg/dL (17 Dec 2024 07:57)  POCT Blood Glucose.: 101 mg/dL (17 Dec 2024 05:48)  POCT Blood Glucose.: 103 mg/dL (16 Dec 2024 23:42)        RADIOLOGY & ADDITIONAL TESTS:  Results Reviewed:   Imaging Personally Reviewed:  Electrocardiogram Personally Reviewed:

## 2024-12-17 NOTE — DISCHARGE NOTE PROVIDER - HOSPITAL COURSE
74 y.o. male with hx of coronary artery disease s/p drug-eluting stent therapy of the proximal LAD in November 2022 , carotid artery disease (80% left carotid stenosis), peripheral arterial disease right SFA stent therapy at that time and then subsequently underwent left SFA stent therapy in 2023, hypertension, hyperlipidemia and lung cancer treated with chemotherapy and radiation and apparently has no evidence of disease at this time. He presented to [facility] in February 2024 with a left occipital stroke. Had recent dx of septic pneumonia 1 months ago & hospital stay at [place]. Patient was diagnosed with Covid today at PCP. Patient had sudden, crushing, central non radiating chest pain, PCP gave 2 sprays of nitro. Patient then had seizure-like activity per family, likely vasovagaled. BP was 70s/40s, hypoxic. Patient had no post-ictal state, patient is A&Ox3. Admitted with COVID infection and septic shock. Started on IV covid rx and IV abx and steriods. Started on Zosyn for possible superimposed bacterial PNA.  Cardiology consulted, trop negative x 3.  TTE with no acute change, EF 65-70%. CP had resolved. Pt can follow up with Cardiology outpt.  Pt started to c/o diarrhea. GI PCR positive for norovirus. Supportive care provided. CT A/P: Marked gastric distention which may be secondary to gastroparesis. Right lower lobe pneumonia. Bilateral lower lobe bronchiectasis. ID consulted and recommended discontinuing zosyn and steriods. Diarrhea decreased. Pt clinically improving, breathing comfortably on RA and stable for discharge.                    74 y.o. male with hx of coronary artery disease s/p drug-eluting stent therapy of the proximal LAD in November 2022 , carotid artery disease (80% left carotid stenosis), peripheral arterial disease right SFA stent therapy at that time and then subsequently underwent left SFA stent therapy in 2023, hypertension, hyperlipidemia and lung cancer treated with chemotherapy and radiation and apparently has no evidence of disease at this time. He presented to [facility] in February 2024 with a left occipital stroke. Had recent dx of septic pneumonia 1 months ago & hospital stay at [place]. Patient was diagnosed with Covid prior to admission at PCP office. Patient had sudden, crushing, central non radiating chest pain, PCP gave 2 sprays of nitro. Patient then had seizure-like activity per family, likely vasovagaled. BP was 70s/40s, hypoxic. Patient had no post-ictal state, patient is A&Ox3. Admitted with COVID infection /septic shock. Started on IV covid rx with remdesivir and Decadron and IV abx. Started on Zosyn for possible superimposed bacterial PNA.  Cardiology consulted, trop negative x 3.  TTE with no acute change, EF 65-70%. CP had resolved. Pt can follow up with Cardiology outpt.  Pt started to c/o diarrhea. GI PCR positive for norovirus. Supportive care provided, C. diff ordered and negative given recent Abx exposure. Steroids DC'd, Remdesivir completed, Abx's DC'd given viral illness as per ID who was consulted 12/16. CT A/P: Marked gastric distention which may be secondary to gastroparesis. Right lower lobe pneumonia. Bilateral lower lobe bronchiectasis. ID consulted and recommended discontinuing zosyn and steriods. Diarrhea improving, Abd pain improving. Home O2 eval performed and patient was not seen to require O2 while ambulating on RA. Pt clinically improving, breathing comfortably on RA and stable for discharge with continued outpatient follow up with cardiology / PCP.

## 2024-12-17 NOTE — DISCHARGE NOTE NURSING/CASE MANAGEMENT/SOCIAL WORK - NSDCPEFALRISK_GEN_ALL_CORE
For information on Fall & Injury Prevention, visit: [email or web address] OR  [email or web address] OR  https://www.cdc.gov/steadi/patient.html

## 2024-12-17 NOTE — PROGRESS NOTE ADULT - SUBJECTIVE AND OBJECTIVE BOX
[facility]  INFECTIOUS DISEASES at [place] / [place] / [place]  =======================================================                              Gustabo Velasquez MD                              Professor Emeritus:  Dr Dion Sales MD            Diplomates American Board of Internal Medicine & Infectious Diseases                                   Tel  [phone] Fax [phone]                                  Hospital Consult line:  [phone]  =======================================================      JADE MESSINA [id number]    Follow up: Viral Diarrhea with  Norovirus    No fevers   Diarrhea improved   No abd pain  Tolerating diet      Allergies:  sulfa topicals (Unknown)  Plavix (Stomach Upset)       REVIEW OF SYSTEMS:  CONSTITUTIONAL:  No Fever or chills  HEENT:  No diplopia or blurred vision.  No earache, sore throat or runny nose.  CARDIOVASCULAR:  No chest pain  RESPIRATORY:  No cough, shortness of breath  GASTROINTESTINAL:  No nausea, vomiting, diarrhea.  GENITOURINARY:  No dysuria, frequency or urgency. No Blood in urine  MUSCULOSKELETAL:  no joint aches, no muscle pain  SKIN:  No change in skin, hair or nails.  NEUROLOGIC:  No Headaches      Physical Exam:  GEN: NAD  HEENT: normocephalic and atraumatic. EOMI. PERRL.    NECK: Supple.   LUNGS: CTA B/L.  HEART: RRR  ABDOMEN: Soft, NT, ND.  +BS.    : No CVA tenderness  EXTREMITIES: Without  edema.  MSK: No joint swelling  NEUROLOGIC: No Focal Deficits   SKIN: No rash      Vitals:  T(F): 98 (17 Dec 2024 05:10), Max: 98.6 (16 Dec 2024 16:00)  HR: 79 (17 Dec 2024 05:10)  BP: 139/69 (17 Dec 2024 05:10)  RR: 18 (17 Dec 2024 05:10)  SpO2: 97% (17 Dec 2024 05:10) (96% - 99%)  temp max in last 48H T(F): , Max: 98.6 (12-16-24 @ 16:00)      Current Antibiotics:    Other medications:  aspirin enteric coated 81 milliGRAM(s) Oral daily  dextrose 5%. 1000 milliLiter(s) IV Continuous <Continuous>  dextrose 5%. 1000 milliLiter(s) IV Continuous <Continuous>  dextrose 50% Injectable 25 Gram(s) IV Push once  dextrose 50% Injectable 12.5 Gram(s) IV Push once  dextrose 50% Injectable 25 Gram(s) IV Push once  famotidine    Tablet 10 milliGRAM(s) Oral daily  glucagon  Injectable 1 milliGRAM(s) IntraMuscular once  heparin   Injectable 5000 Unit(s) SubCutaneous every 12 hours  insulin glargine Injectable (LANTUS) 40 Unit(s) SubCutaneous at bedtime  insulin lispro (ADMELOG) corrective regimen sliding scale   SubCutaneous three times a day before meals  insulin lispro Injectable (ADMELOG) 5 Unit(s) SubCutaneous three times a day before meals  metoprolol succinate ER 25 milliGRAM(s) Oral daily  pantoprazole    Tablet 40 milliGRAM(s) Oral before breakfast  rosuvastatin 20 milliGRAM(s) Oral at bedtime  sodium bicarbonate 1300 milliGRAM(s) Oral three times a day  sodium chloride 0.9%. 1000 milliLiter(s) IV Continuous <Continuous>                            9.4    14.11 )-----------( 257      ( 17 Dec 2024 05:34 )             30.5     12-17    142  |  109[H]  |  31.3[H]  ----------------------------<  95  4.1   |  20.0[L]  |  2.02[H]    Ca    8.1[L]      17 Dec 2024 05:34    TPro  5.6[L]  /  Alb  2.7[L]  /  TBili  0.2[L]  /  DBili  0.1  /  AST  17  /  ALT  20  /  AlkPhos  55  12-17    RECENT CULTURES:  12-14 @ 11:20 .Stool     No enteric pathogens to date: Final culture pending  Moderate Yeast like cells  No enteric gram negative rods isolated    12-11 @ 21:30 .Sputum Sputum     Commensal jose consistent with body site  No polymorphonuclear leukocytes per low power field  Rare Squamous epithelial cells per low power field  Rare Gram positive cocci in pairs per oil power field    12-10 @ 12:25 .Blood BLOOD     No growth at 5 days      WBC Count: 14.11 K/uL (12-17-24 @ 05:34)  WBC Count: 18.61 K/uL (12-16-24 @ 05:25)  WBC Count: 17.35 K/uL (12-15-24 @ 05:50)  WBC Count: 24.95 K/uL (12-14-24 @ 22:35)  WBC Count: 15.23 K/uL (12-14-24 @ 05:40)  WBC Count: 14.89 K/uL (12-13-24 @ 06:16)    Creatinine: 2.02 mg/dL (12-17-24 @ 05:34)  Creatinine: 2.13 mg/dL (12-16-24 @ 05:25)  Creatinine: 2.41 mg/dL (12-15-24 @ 05:50)  Creatinine: 2.72 mg/dL (12-14-24 @ 22:35)  Creatinine: 2.66 mg/dL (12-14-24 @ 05:40)  Creatinine: 2.82 mg/dL (12-13-24 @ 06:16)    Procalcitonin: 0.20 ng/mL (12-14-24 @ 22:35)  Procalcitonin: 0.30 ng/mL (12-11-24 @ 02:03)     SARS-CoV-2 Result: Detected (12-10-24 @ 12:40)          < from: CT Abdomen and Pelvis No Cont (12.15.24 @ 13:12) >  ACC: [id number] EXAM:  CT ABDOMEN AND PELVIS   ORDERED BY: PRIYA MEADE     PROCEDURE DATE:  12/15/2024      INTERPRETATION:  CLINICAL INFORMATION: 74-year-old man with abdominal   pain and multiple episodes of diarrhea. Admitted with sepsis secondary to   bacterial pneumonia, Covid positive, hypotension    COMPARISON: None.    CONTRAST/COMPLICATIONS:  IV Contrast: NONE  Oral Contrast: NONE  .    PROCEDURE:  CT of the Abdomen and Pelvis was performed.  Sagittal and coronal reformats were performed.    FINDINGS:  LOWER CHEST: Coronary artery calcification. Right lower lobe groundglass   and solid consolidation. Bilateral lower lobe bronchiectasis.    LIVER: Within normal limits.  BILE DUCTS: Normal caliber.  GALLBLADDER: Contracted.  SPLEEN: Within normal limits.  PANCREAS: Within normal limits.  ADRENALS: Within normal limits.  KIDNEYS/URETERS: Within normal limits.    BLADDER: Within normal limits.  REPRODUCTIVE ORGANS: Mildly enlarged prostate.    BOWEL: No bowel obstruction. Massive gastric distention without   demonstrated obstructing lesion. Appendix normal.  PERITONEUM/RETROPERITONEUM: Within normal limits.  VESSELS: Vascular calcification. Stent extending from right external   iliac artery to proximal femoral artery.  LYMPH NODES: No lymphadenopathy.  ABDOMINAL WALL: Small fat-containing bilateral inguinal hernias.  BONES: Degenerative change.    IMPRESSION:  Marked gastric distention which may be secondary to gastroparesis.  Right lower lobe pneumonia.  Bilateral lower lobe bronchiectasis.    Findings discussed with Dr. Simms on 12/15/2024 at 5:35 PM  with read back.    --- End of Report ---    < end of copied text >

## 2024-12-17 NOTE — PROGRESS NOTE ADULT - REASON FOR ADMISSION
Sepsis due to bacterial PNA and COVID, hypotension

## 2024-12-17 NOTE — DISCHARGE NOTE NURSING/CASE MANAGEMENT/SOCIAL WORK - PATIENT PORTAL LINK FT
You can access the FollowMyHealth Patient Portal offered by [facility] by registering at the following website: [email or web address]. By joining [facility]’s FollowMyHealth portal, you will also be able to view your health information using other applications (apps) compatible with our system.

## 2024-12-17 NOTE — DISCHARGE NOTE NURSING/CASE MANAGEMENT/SOCIAL WORK - NSDCFUADDAPPT_GEN_ALL_CORE_FT
Your Follow up appointments have been made for you with your PCP as per your request.     Appt [facility]/ Dr. Ho on 12/20 at 1:45pm.  If you are unable to attend your pre-scheduled appointment,   please contact the office directly at [phone] to reschedule.    Your meds sent to your preferred pharmacy , you reported no concerns in obtaining meds.     Homecare is not requested as per your wishes.     You have been provided a Yellow folder with resources to further assist you in managing your condition .

## 2024-12-18 ENCOUNTER — NON-APPOINTMENT (OUTPATIENT)
Age: 74
End: 2024-12-18

## 2024-12-18 VITALS
OXYGEN SATURATION: 96 % | SYSTOLIC BLOOD PRESSURE: 136 MMHG | TEMPERATURE: 97 F | DIASTOLIC BLOOD PRESSURE: 78 MMHG | HEART RATE: 99 BPM

## 2024-12-18 LAB
ALBUMIN SERPL ELPH-MCNC: 3 G/DL — LOW (ref 3.3–5.2)
ALP SERPL-CCNC: 62 U/L — SIGNIFICANT CHANGE UP (ref 40–120)
ALT FLD-CCNC: 42 U/L — HIGH
ANION GAP SERPL CALC-SCNC: 13 MMOL/L — SIGNIFICANT CHANGE UP (ref 5–17)
AST SERPL-CCNC: 32 U/L — SIGNIFICANT CHANGE UP
BILIRUB SERPL-MCNC: 0.2 MG/DL — LOW (ref 0.4–2)
BUN SERPL-MCNC: 29.6 MG/DL — HIGH (ref 8–20)
CALCIUM SERPL-MCNC: 8 MG/DL — LOW (ref 8.4–10.5)
CHLORIDE SERPL-SCNC: 108 MMOL/L — SIGNIFICANT CHANGE UP (ref 96–108)
CO2 SERPL-SCNC: 22 MMOL/L — SIGNIFICANT CHANGE UP (ref 22–29)
CREAT SERPL-MCNC: 2.03 MG/DL — HIGH (ref 0.5–1.3)
EGFR: 34 ML/MIN/1.73M2 — LOW
GLUCOSE BLDC GLUCOMTR-MCNC: 103 MG/DL — HIGH (ref 70–99)
GLUCOSE BLDC GLUCOMTR-MCNC: 89 MG/DL — SIGNIFICANT CHANGE UP (ref 70–99)
GLUCOSE SERPL-MCNC: 74 MG/DL — SIGNIFICANT CHANGE UP (ref 70–99)
HCT VFR BLD CALC: 32.2 % — LOW (ref 39–50)
HGB BLD-MCNC: 9.9 G/DL — LOW (ref 13–17)
MAGNESIUM SERPL-MCNC: 1.9 MG/DL — SIGNIFICANT CHANGE UP (ref 1.6–2.6)
MCHC RBC-ENTMCNC: 28.5 PG — SIGNIFICANT CHANGE UP (ref 27–34)
MCHC RBC-ENTMCNC: 30.7 G/DL — LOW (ref 32–36)
MCV RBC AUTO: 92.8 FL — SIGNIFICANT CHANGE UP (ref 80–100)
PLATELET # BLD AUTO: 246 K/UL — SIGNIFICANT CHANGE UP (ref 150–400)
POTASSIUM SERPL-MCNC: 4 MMOL/L — SIGNIFICANT CHANGE UP (ref 3.5–5.3)
POTASSIUM SERPL-SCNC: 4 MMOL/L — SIGNIFICANT CHANGE UP (ref 3.5–5.3)
PROT SERPL-MCNC: 5.9 G/DL — LOW (ref 6.6–8.7)
RBC # BLD: 3.47 M/UL — LOW (ref 4.2–5.8)
RBC # FLD: 17.2 % — HIGH (ref 10.3–14.5)
SODIUM SERPL-SCNC: 143 MMOL/L — SIGNIFICANT CHANGE UP (ref 135–145)
WBC # BLD: 12.59 K/UL — HIGH (ref 3.8–10.5)
WBC # FLD AUTO: 12.59 K/UL — HIGH (ref 3.8–10.5)

## 2024-12-18 PROCEDURE — 84295 ASSAY OF SERUM SODIUM: CPT

## 2024-12-18 PROCEDURE — 85025 COMPLETE CBC W/AUTO DIFF WBC: CPT

## 2024-12-18 PROCEDURE — 96375 TX/PRO/DX INJ NEW DRUG ADDON: CPT

## 2024-12-18 PROCEDURE — 82803 BLOOD GASES ANY COMBINATION: CPT

## 2024-12-18 PROCEDURE — 0241U: CPT

## 2024-12-18 PROCEDURE — 82947 ASSAY GLUCOSE BLOOD QUANT: CPT

## 2024-12-18 PROCEDURE — 87493 C DIFF AMPLIFIED PROBE: CPT

## 2024-12-18 PROCEDURE — 81001 URINALYSIS AUTO W/SCOPE: CPT

## 2024-12-18 PROCEDURE — 87640 STAPH A DNA AMP PROBE: CPT

## 2024-12-18 PROCEDURE — 87507 IADNA-DNA/RNA PROBE TQ 12-25: CPT

## 2024-12-18 PROCEDURE — 82728 ASSAY OF FERRITIN: CPT

## 2024-12-18 PROCEDURE — 76775 US EXAM ABDO BACK WALL LIM: CPT

## 2024-12-18 PROCEDURE — 36415 COLL VENOUS BLD VENIPUNCTURE: CPT

## 2024-12-18 PROCEDURE — 74176 CT ABD & PELVIS W/O CONTRAST: CPT | Mod: MC

## 2024-12-18 PROCEDURE — 85018 HEMOGLOBIN: CPT

## 2024-12-18 PROCEDURE — 84484 ASSAY OF TROPONIN QUANT: CPT

## 2024-12-18 PROCEDURE — 96366 THER/PROPH/DIAG IV INF ADDON: CPT

## 2024-12-18 PROCEDURE — 87046 STOOL CULTR AEROBIC BACT EA: CPT

## 2024-12-18 PROCEDURE — 87641 MR-STAPH DNA AMP PROBE: CPT

## 2024-12-18 PROCEDURE — 83550 IRON BINDING TEST: CPT

## 2024-12-18 PROCEDURE — 83605 ASSAY OF LACTIC ACID: CPT

## 2024-12-18 PROCEDURE — 87899 AGENT NOS ASSAY W/OPTIC: CPT

## 2024-12-18 PROCEDURE — 84466 ASSAY OF TRANSFERRIN: CPT

## 2024-12-18 PROCEDURE — 71046 X-RAY EXAM CHEST 2 VIEWS: CPT

## 2024-12-18 PROCEDURE — 94640 AIRWAY INHALATION TREATMENT: CPT

## 2024-12-18 PROCEDURE — 80048 BASIC METABOLIC PNL TOTAL CA: CPT

## 2024-12-18 PROCEDURE — 87070 CULTURE OTHR SPECIMN AEROBIC: CPT

## 2024-12-18 PROCEDURE — 82962 GLUCOSE BLOOD TEST: CPT

## 2024-12-18 PROCEDURE — 96365 THER/PROPH/DIAG IV INF INIT: CPT

## 2024-12-18 PROCEDURE — 83540 ASSAY OF IRON: CPT

## 2024-12-18 PROCEDURE — 87040 BLOOD CULTURE FOR BACTERIA: CPT

## 2024-12-18 PROCEDURE — 99239 HOSP IP/OBS DSCHRG MGMT >30: CPT

## 2024-12-18 PROCEDURE — 84132 ASSAY OF SERUM POTASSIUM: CPT

## 2024-12-18 PROCEDURE — 87045 FECES CULTURE AEROBIC BACT: CPT

## 2024-12-18 PROCEDURE — 80053 COMPREHEN METABOLIC PANEL: CPT

## 2024-12-18 PROCEDURE — 80076 HEPATIC FUNCTION PANEL: CPT

## 2024-12-18 PROCEDURE — 70450 CT HEAD/BRAIN W/O DYE: CPT | Mod: MC

## 2024-12-18 PROCEDURE — 85730 THROMBOPLASTIN TIME PARTIAL: CPT

## 2024-12-18 PROCEDURE — 93306 TTE W/DOPPLER COMPLETE: CPT

## 2024-12-18 PROCEDURE — 85610 PROTHROMBIN TIME: CPT

## 2024-12-18 PROCEDURE — 83735 ASSAY OF MAGNESIUM: CPT

## 2024-12-18 PROCEDURE — 82550 ASSAY OF CK (CPK): CPT

## 2024-12-18 PROCEDURE — 85014 HEMATOCRIT: CPT

## 2024-12-18 PROCEDURE — 99285 EMERGENCY DEPT VISIT HI MDM: CPT | Mod: 25

## 2024-12-18 PROCEDURE — 83036 HEMOGLOBIN GLYCOSYLATED A1C: CPT

## 2024-12-18 PROCEDURE — 71045 X-RAY EXAM CHEST 1 VIEW: CPT

## 2024-12-18 PROCEDURE — 84145 PROCALCITONIN (PCT): CPT

## 2024-12-18 PROCEDURE — 84100 ASSAY OF PHOSPHORUS: CPT

## 2024-12-18 PROCEDURE — 87637 SARSCOV2&INF A&B&RSV AMP PRB: CPT

## 2024-12-18 PROCEDURE — 85027 COMPLETE CBC AUTOMATED: CPT

## 2024-12-18 PROCEDURE — 82435 ASSAY OF BLOOD CHLORIDE: CPT

## 2024-12-18 PROCEDURE — 93005 ELECTROCARDIOGRAM TRACING: CPT

## 2024-12-18 PROCEDURE — 96368 THER/DIAG CONCURRENT INF: CPT

## 2024-12-18 PROCEDURE — 82330 ASSAY OF CALCIUM: CPT

## 2024-12-18 PROCEDURE — 87449 NOS EACH ORGANISM AG IA: CPT

## 2024-12-18 PROCEDURE — 87205 SMEAR GRAM STAIN: CPT

## 2024-12-18 RX ADMIN — Medication 5 UNIT(S): at 08:22

## 2024-12-18 RX ADMIN — Medication 2 MILLIGRAM(S): at 06:38

## 2024-12-18 RX ADMIN — SODIUM BICARBONATE 1300 MILLIGRAM(S): 84 INJECTION, SOLUTION INTRAVENOUS at 06:38

## 2024-12-18 RX ADMIN — Medication 5000 UNIT(S): at 06:38

## 2024-12-18 RX ADMIN — METOPROLOL TARTRATE 25 MILLIGRAM(S): 100 TABLET, FILM COATED ORAL at 06:38

## 2024-12-18 RX ADMIN — ACETAMINOPHEN 500MG 650 MILLIGRAM(S): 500 TABLET, COATED ORAL at 00:15

## 2024-12-18 RX ADMIN — PANTOPRAZOLE SODIUM 40 MILLIGRAM(S): 40 TABLET, DELAYED RELEASE ORAL at 06:38

## 2024-12-19 ENCOUNTER — TRANSCRIPTION ENCOUNTER (OUTPATIENT)
Age: 74
End: 2024-12-19

## 2024-12-23 ENCOUNTER — TRANSCRIPTION ENCOUNTER (OUTPATIENT)
Age: 74
End: 2024-12-23

## 2024-12-26 ENCOUNTER — TRANSCRIPTION ENCOUNTER (OUTPATIENT)
Age: 74
End: 2024-12-26

## 2025-01-31 ENCOUNTER — APPOINTMENT (OUTPATIENT)
Dept: DERMATOLOGY | Facility: CLINIC | Age: 75
End: 2025-01-31
Payer: MEDICARE

## 2025-01-31 PROCEDURE — 17003 DESTRUCT PREMALG LES 2-14: CPT

## 2025-01-31 PROCEDURE — 17000 DESTRUCT PREMALG LESION: CPT

## 2025-01-31 PROCEDURE — 99214 OFFICE O/P EST MOD 30 MIN: CPT | Mod: 25

## (undated) DEVICE — ADAPTER BIOPSY VALVE

## (undated) DEVICE — TUBING CONNECTING 6MM 20FT

## (undated) DEVICE — SOL INJ NS 0.9% 100ML

## (undated) DEVICE — SYR CONTROL LUER LOK 10CC

## (undated) DEVICE — VALVE SUCTION EVIS 160/200/240

## (undated) DEVICE — VISITEC 4X4

## (undated) DEVICE — BRUSH CYTO DISP

## (undated) DEVICE — VALVE BIOPSY BRONCHOVIDEOSCOPE

## (undated) DEVICE — NDL ASPIRATION VIZISHOT2 21G

## (undated) DEVICE — GLV 7.5 PROTEXIS (WHITE)

## (undated) DEVICE — DRAPE XL SHEET 77X98"

## (undated) DEVICE — BALLOON SINGLE FOR BF-UC160F

## (undated) DEVICE — WARMING BLANKET LOWER ADULT

## (undated) DEVICE — VENODYNE/SCD SLEEVE CALF MEDIUM

## (undated) DEVICE — STOPCOCK 3 WAY W SWIVEL MALE LUER LOCK

## (undated) DEVICE — SOL IRR POUR NS 0.9% 1000ML

## (undated) DEVICE — DRAPE 3/4 SHEET 52X76"

## (undated) DEVICE — TRAP SPECIMEN SPUTUM 40CC

## (undated) DEVICE — PACK BASIC GOWN MAYO COVER

## (undated) DEVICE — TUBING SUCTION CONN 6FT STERILE

## (undated) DEVICE — SOL IRR POUR H2O 1000ML